# Patient Record
Sex: MALE | Race: BLACK OR AFRICAN AMERICAN | NOT HISPANIC OR LATINO | Employment: FULL TIME | URBAN - METROPOLITAN AREA
[De-identification: names, ages, dates, MRNs, and addresses within clinical notes are randomized per-mention and may not be internally consistent; named-entity substitution may affect disease eponyms.]

---

## 2017-01-10 ENCOUNTER — OFFICE VISIT (OUTPATIENT)
Dept: FAMILY MEDICINE | Facility: CLINIC | Age: 57
End: 2017-01-10
Payer: COMMERCIAL

## 2017-01-10 VITALS
TEMPERATURE: 98 F | OXYGEN SATURATION: 99 % | HEART RATE: 75 BPM | BODY MASS INDEX: 28.7 KG/M2 | RESPIRATION RATE: 18 BRPM | HEIGHT: 72 IN | WEIGHT: 211.88 LBS | DIASTOLIC BLOOD PRESSURE: 72 MMHG | SYSTOLIC BLOOD PRESSURE: 112 MMHG

## 2017-01-10 DIAGNOSIS — J01.90 ACUTE SINUSITIS, RECURRENCE NOT SPECIFIED, UNSPECIFIED LOCATION: Primary | ICD-10-CM

## 2017-01-10 PROCEDURE — 99213 OFFICE O/P EST LOW 20 MIN: CPT | Mod: 25,S$GLB,, | Performed by: FAMILY MEDICINE

## 2017-01-10 PROCEDURE — 1159F MED LIST DOCD IN RCRD: CPT | Mod: S$GLB,,, | Performed by: FAMILY MEDICINE

## 2017-01-10 PROCEDURE — 99999 PR PBB SHADOW E&M-EST. PATIENT-LVL III: CPT | Mod: PBBFAC,,, | Performed by: FAMILY MEDICINE

## 2017-01-10 PROCEDURE — 96372 THER/PROPH/DIAG INJ SC/IM: CPT | Mod: S$GLB,,, | Performed by: FAMILY MEDICINE

## 2017-01-10 RX ORDER — AMOXICILLIN AND CLAVULANATE POTASSIUM 875; 125 MG/1; MG/1
1 TABLET, FILM COATED ORAL 2 TIMES DAILY
Qty: 20 TABLET | Refills: 0 | Status: SHIPPED | OUTPATIENT
Start: 2017-01-10 | End: 2017-01-20

## 2017-01-10 RX ORDER — BETAMETHASONE SODIUM PHOSPHATE AND BETAMETHASONE ACETATE 3; 3 MG/ML; MG/ML
12 INJECTION, SUSPENSION INTRA-ARTICULAR; INTRALESIONAL; INTRAMUSCULAR; SOFT TISSUE
Status: COMPLETED | OUTPATIENT
Start: 2017-01-10 | End: 2017-01-10

## 2017-01-10 RX ADMIN — BETAMETHASONE SODIUM PHOSPHATE AND BETAMETHASONE ACETATE 12 MG: 3; 3 INJECTION, SUSPENSION INTRA-ARTICULAR; INTRALESIONAL; INTRAMUSCULAR; SOFT TISSUE at 03:01

## 2017-01-10 NOTE — PROGRESS NOTES
Subjective:       Patient ID: Rob Hickey Jr. is a 56 y.o. male.    Chief Complaint: Sinus Problem    HPI Comments: Mr. Hickey, a nonsmoker, comes in today with complaint of having intermittent sinus symptoms-slight wheezing, itchy eyes, and sore throat early in the week, sinus pressure, headache, nasal congestion, postnasal drip without other associated symptoms.  He states he has been taken over-the-counter sinus medication with little help.  He did not have flu shot this fall.        Sinus Problem   Associated symptoms include congestion, headaches, sinus pressure and a sore throat. Pertinent negatives include no chills, coughing, shortness of breath or sneezing.     Review of Systems   Constitutional: Negative for appetite change, chills, fatigue and fever.   HENT: Positive for congestion, postnasal drip, sinus pressure and sore throat. Negative for rhinorrhea and sneezing.    Eyes: Negative for pain, discharge, redness and itching.   Respiratory: Positive for wheezing. Negative for cough and shortness of breath.    Cardiovascular: Negative for chest pain and palpitations.   Gastrointestinal: Negative for abdominal pain, diarrhea, nausea and vomiting.   Musculoskeletal: Negative for myalgias.   Neurological: Positive for headaches.       Objective:      Physical Exam   Constitutional: He is oriented to person, place, and time. He appears well-developed and well-nourished. No distress.   Pleasant.   HENT:   Head: Normocephalic and atraumatic.   Right Ear: External ear normal.   Left Ear: External ear normal.   Nose: Nose normal.   Mouth/Throat: Oropharynx is clear and moist. No oropharyngeal exudate.   Non tender sinuses.  TMs shiny and clear.  Nasal mucosa inflamed, congested (left > right) with slight yellowish drainage noted.   Eyes: Conjunctivae and EOM are normal. Pupils are equal, round, and reactive to light. Right eye exhibits no discharge. Left eye exhibits no discharge.   He wears glasses.   Neck:  Normal range of motion. Neck supple. No thyromegaly present.   Cardiovascular: Normal rate, regular rhythm, normal heart sounds and intact distal pulses.    No murmur heard.  Pulmonary/Chest: Effort normal and breath sounds normal. No respiratory distress. He has no wheezes.   Abdominal: Soft. Bowel sounds are normal. He exhibits no distension and no mass. There is no tenderness. There is no rebound and no guarding.   Musculoskeletal: Normal range of motion. He exhibits no edema or tenderness.   He is ambulatory without problems.   Lymphadenopathy:     He has no cervical adenopathy.   Neurological: He is alert and oriented to person, place, and time.   Skin: He is not diaphoretic.   Psychiatric: He has a normal mood and affect. His behavior is normal. Judgment and thought content normal.   Vitals reviewed.      Assessment:       1. Acute sinusitis, recurrence not specified, unspecified location        Plan:       Augmentin 875 mg twice daily for 10 days.  Celestone 12 mg IM x 1 today.  Continue symptomatic treatment.  Follow up sooner if no improvement or worsening symptoms noted.

## 2017-01-10 NOTE — MR AVS SNAPSHOT
[facility]  [address] [place] [address]  Phone: [phone]                  Rob Hickey Jr.   1/10/2017 8:30 AM   Office Visit    Description:  Male : 1960   Provider:  Mary Bruner MD   Department:  [facility]           Reason for Visit     Sinus Problem           Diagnoses this Visit        Comments    Acute sinusitis, recurrence not specified, unspecified location    -  Primary            To Do List           Goals (5 Years of Data)     None      Follow-Up and Disposition     Return if symptoms worsen or fail to improve.       These Medications        Disp Refills Start End    amoxicillin-clavulanate 875-125mg (AUGMENTIN) 875-125 mg per tablet 20 tablet 0 1/10/2017 2017    Take 1 tablet by mouth 2 (two) times daily. - Oral    Pharmacy: [facility]/pharmacy #[id number] - [address] #: [phone]         [facility] On Call     [facility] On Call Nurse Care Line -  Assistance  Registered nurses in the [facility] On Call Center provide clinical advisement, health education, appointment booking, and other advisory services.  Call for this free service at [phone].             Medications           Message regarding Medications     Verify the changes and/or additions to your medication regime listed below are the same as discussed with your clinician today.  If any of these changes or additions are incorrect, please notify your healthcare provider.        START taking these NEW medications        Refills    amoxicillin-clavulanate 875-125mg (AUGMENTIN) 875-125 mg per tablet 0    Sig: Take 1 tablet by mouth 2 (two) times daily.    Class: Normal    Route: Oral      These medications were administered today        Dose Freq    betamethasone acetate-betamethasone sodium phosphate injection 12 mg 12 mg Clinic/HOD 1 time    Sig: Inject 2 mLs (12 mg total) into the muscle one time.    Class: Normal    Route: Intramuscular     "       Verify that the below list of medications is an accurate representation of the medications you are currently taking.  If none reported, the list may be blank. If incorrect, please contact your healthcare provider. Carry this list with you in case of emergency.           Current Medications     amoxicillin-clavulanate 875-125mg (AUGMENTIN) 875-125 mg per tablet Take 1 tablet by mouth 2 (two) times daily.    sertraline (ZOLOFT) 50 MG tablet Take 1 tablet (50 mg total) by mouth once daily.           Clinical Reference Information           Vital Signs - Last Recorded  Most recent update: 1/10/2017  2:46 PM by Sada Kuo MA    BP Pulse Temp Resp    112/72 (BP Location: Right arm, Patient Position: Sitting, BP Method: Manual) 75 98.2 °F (36.8 °C) (Tympanic) 18    Ht Wt SpO2 BMI    5' 11.5" (1.816 m) 96.1 kg (211 lb 13.8 oz) 99% 29.14 kg/m2      Blood Pressure          Most Recent Value    BP  112/72      Allergies as of 1/10/2017     No Known Allergies      Immunizations Administered on Date of Encounter - 1/10/2017     None      Administrations This Visit     betamethasone acetate-betamethasone sodium phosphate injection 12 mg     Admin Date Action Dose Route Administered By             01/10/2017 Given 12 mg Intramuscular Mey Astudillo LPN                      [facility] Sign-Up     Activating your [facility] account is as easy as 1-2-3!     1) Visit [email or web address], select Sign Up Now, enter this activation code and your date of birth, then select Next.  [email or web address]  Expires: 2/24/2017  3:09 PM      2) Create a username and password to use when you visit [facility] in the future and select a security question in case you lose your password and select Next.    3) Enter your e-mail address and click Sign Up!    Additional Information  If you have questions, please e-mail [email or web address] or call [phone] to talk to our [facility] staff. Remember, [facility] is NOT to be used for urgent " needs. For medical emergencies, dial 911.

## 2019-12-10 ENCOUNTER — OFFICE VISIT (OUTPATIENT)
Dept: FAMILY MEDICINE | Facility: CLINIC | Age: 59
End: 2019-12-10
Payer: COMMERCIAL

## 2019-12-10 VITALS
DIASTOLIC BLOOD PRESSURE: 70 MMHG | SYSTOLIC BLOOD PRESSURE: 110 MMHG | TEMPERATURE: 98 F | RESPIRATION RATE: 16 BRPM | BODY MASS INDEX: 34.09 KG/M2 | OXYGEN SATURATION: 98 % | HEIGHT: 70 IN | HEART RATE: 91 BPM | WEIGHT: 238.13 LBS

## 2019-12-10 DIAGNOSIS — Z23 NEED FOR INFLUENZA VACCINATION: ICD-10-CM

## 2019-12-10 DIAGNOSIS — L82.1 SEBORRHEIC KERATOSES: Primary | ICD-10-CM

## 2019-12-10 DIAGNOSIS — Z23 NEED FOR TDAP VACCINATION: ICD-10-CM

## 2019-12-10 PROCEDURE — 99999 PR PBB SHADOW E&M-EST. PATIENT-LVL III: CPT | Mod: PBBFAC,,, | Performed by: FAMILY MEDICINE

## 2019-12-10 PROCEDURE — 90471 FLU VACCINE (QUAD) GREATER THAN OR EQUAL TO 3YO PRESERVATIVE FREE IM: ICD-10-PCS | Mod: S$GLB,,, | Performed by: FAMILY MEDICINE

## 2019-12-10 PROCEDURE — 90472 IMMUNIZATION ADMIN EACH ADD: CPT | Mod: S$GLB,,, | Performed by: FAMILY MEDICINE

## 2019-12-10 PROCEDURE — 99999 PR PBB SHADOW E&M-EST. PATIENT-LVL III: ICD-10-PCS | Mod: PBBFAC,,, | Performed by: FAMILY MEDICINE

## 2019-12-10 PROCEDURE — 90715 TDAP VACCINE 7 YRS/> IM: CPT | Mod: S$GLB,,, | Performed by: FAMILY MEDICINE

## 2019-12-10 PROCEDURE — 90686 IIV4 VACC NO PRSV 0.5 ML IM: CPT | Mod: S$GLB,,, | Performed by: FAMILY MEDICINE

## 2019-12-10 PROCEDURE — 99213 PR OFFICE/OUTPT VISIT, EST, LEVL III, 20-29 MIN: ICD-10-PCS | Mod: 25,S$GLB,, | Performed by: FAMILY MEDICINE

## 2019-12-10 PROCEDURE — 90686 FLU VACCINE (QUAD) GREATER THAN OR EQUAL TO 3YO PRESERVATIVE FREE IM: ICD-10-PCS | Mod: S$GLB,,, | Performed by: FAMILY MEDICINE

## 2019-12-10 PROCEDURE — 90472 TDAP VACCINE GREATER THAN OR EQUAL TO 7YO IM: ICD-10-PCS | Mod: S$GLB,,, | Performed by: FAMILY MEDICINE

## 2019-12-10 PROCEDURE — 90715 TDAP VACCINE GREATER THAN OR EQUAL TO 7YO IM: ICD-10-PCS | Mod: S$GLB,,, | Performed by: FAMILY MEDICINE

## 2019-12-10 PROCEDURE — 99213 OFFICE O/P EST LOW 20 MIN: CPT | Mod: 25,S$GLB,, | Performed by: FAMILY MEDICINE

## 2019-12-10 PROCEDURE — 90471 IMMUNIZATION ADMIN: CPT | Mod: S$GLB,,, | Performed by: FAMILY MEDICINE

## 2019-12-10 NOTE — PROGRESS NOTES
Rob Hickey .    Chief Complaint   Patient presents with    Skin Check       History of Present Illness:   Mr. Hickey comes in today with concern about moles in his face (with specific concern about 1 particular mole at his right cheek).  He states he sister, who is a physician, advised him to get his skin checked.  He reports no change in size of moles his face; he denies pain, itching, or bleeding of moles in his face.  He denies personal history of skin cancer or known family history of skin cancer.    Otherwise, he denies having fever, chills, fatigue, appetite changes; shortness of breath, cough, wheezing; chest pain, palpitations, leg swelling; abdominal pain, nausea, vomiting, diarrhea, constipation; unusual urinary symptoms; back pain; headache; anxiety, depression, homicidal or suicidal thoughts.     He requests immunizations (flu and tetanus) today.  He states he has not had tetanus shot within the last 10 years.               No current outpatient medications on file.       Review of Systems   Constitutional: Negative for appetite change, chills, fatigue and fever.   Respiratory: Negative for cough, shortness of breath and wheezing.    Cardiovascular: Negative for chest pain, palpitations and leg swelling.   Gastrointestinal: Negative for abdominal pain, constipation, diarrhea, nausea and vomiting.   Genitourinary: Negative for difficulty urinating.   Skin:        See history of present illness.   Neurological: Negative for headaches.   Psychiatric/Behavioral: Negative for dysphoric mood and suicidal ideas. The patient is not nervous/anxious.         Negative for homicidal ideas.         Objective:  Physical Exam   Constitutional: He is oriented to person, place, and time. He appears well-developed and well-nourished. No distress.   Pleasant.   Neck: Normal range of motion. Neck supple. No thyromegaly present.   Cardiovascular: Normal rate, regular rhythm, normal heart sounds and intact distal  pulses.   No murmur heard.  Pulmonary/Chest: Effort normal and breath sounds normal. No respiratory distress. He has no wheezes.   Abdominal: Soft. Bowel sounds are normal. He exhibits no distension and no mass. There is no tenderness. There is no guarding.   Musculoskeletal: Normal range of motion.   He is ambulatory without problems.   Lymphadenopathy:     He has no cervical adenopathy.   Neurological: He is alert and oriented to person, place, and time.   Skin: He is not diaphoretic.   Benign-appearing flat, brown moles at face of varying sizes (seborrheic keratoses) without inflammation noted - large mole at each side of face (right cheek) and (below left temple).  Also, benign-appearing flat, brown moles/skin tags without inflammation noted at neck.   Psychiatric: He has a normal mood and affect. His behavior is normal. Judgment and thought content normal.   He has slight depressed demeanor when speaking of his job.   Vitals reviewed.      ASSESSMENT:  1. Seborrheic keratoses    2. Need for influenza vaccination    3. Need for Tdap vaccination        PLAN:  Rob was seen today for skin check.    Diagnoses and all orders for this visit:    Seborrheic keratoses    Need for influenza vaccination  -     Influenza - Quadrivalent (6 months+) (PF)    Need for Tdap vaccination  -     Tdap Vaccine    Reassurance regarding benign-appearing skin findings; however, advised patient to closely monitor and follow up if changes noted.  Continue current medications, follow low sodium, low cholesterol, low carb diet, daily walks.  Follow up for physical in 2020.

## 2020-03-02 ENCOUNTER — PATIENT OUTREACH (OUTPATIENT)
Dept: ADMINISTRATIVE | Facility: HOSPITAL | Age: 60
End: 2020-03-02

## 2020-03-02 DIAGNOSIS — Z12.11 ENCOUNTER FOR SCREENING COLONOSCOPY: Primary | ICD-10-CM

## 2020-03-03 ENCOUNTER — TELEPHONE (OUTPATIENT)
Dept: ENDOSCOPY | Facility: HOSPITAL | Age: 60
End: 2020-03-03

## 2020-08-10 ENCOUNTER — OFFICE VISIT (OUTPATIENT)
Dept: FAMILY MEDICINE | Facility: CLINIC | Age: 60
End: 2020-08-10
Payer: COMMERCIAL

## 2020-08-10 VITALS
WEIGHT: 248.25 LBS | BODY MASS INDEX: 35.54 KG/M2 | OXYGEN SATURATION: 98 % | RESPIRATION RATE: 16 BRPM | HEIGHT: 70 IN | TEMPERATURE: 99 F | DIASTOLIC BLOOD PRESSURE: 80 MMHG | SYSTOLIC BLOOD PRESSURE: 148 MMHG | HEART RATE: 71 BPM

## 2020-08-10 DIAGNOSIS — E66.9 OBESITY (BMI 35.0-39.9 WITHOUT COMORBIDITY): ICD-10-CM

## 2020-08-10 DIAGNOSIS — Z00.00 ANNUAL PHYSICAL EXAM: ICD-10-CM

## 2020-08-10 DIAGNOSIS — Z12.11 COLON CANCER SCREENING: ICD-10-CM

## 2020-08-10 DIAGNOSIS — R00.2 PALPITATIONS: ICD-10-CM

## 2020-08-10 DIAGNOSIS — R03.0 ELEVATED BLOOD PRESSURE READING: ICD-10-CM

## 2020-08-10 PROCEDURE — 99396 PR PREVENTIVE VISIT,EST,40-64: ICD-10-PCS | Mod: S$GLB,,, | Performed by: FAMILY MEDICINE

## 2020-08-10 PROCEDURE — 99396 PREV VISIT EST AGE 40-64: CPT | Mod: S$GLB,,, | Performed by: FAMILY MEDICINE

## 2020-08-10 PROCEDURE — 99999 PR PBB SHADOW E&M-EST. PATIENT-LVL IV: CPT | Mod: PBBFAC,,, | Performed by: FAMILY MEDICINE

## 2020-08-10 PROCEDURE — 99999 PR PBB SHADOW E&M-EST. PATIENT-LVL IV: ICD-10-PCS | Mod: PBBFAC,,, | Performed by: FAMILY MEDICINE

## 2020-08-10 NOTE — PROGRESS NOTES
HISTORY OF PRESENT ILLNESS: Mr. Hickey comes in today for annual wellness examination.  He is not fasting today.    END OF LIFE DECISION:He does not have a living will. He does desire life support.    No current outpatient medications on file.     SCREENINGS:    Cholesterol: February 4, 2004.  Colonoscopy: Never. He desires.  Prostate Exam/PSA: Never. He desires.  Eye Exam: September 2019 per patient. He glasses.  HCVAb: Never. He desires.  HIVAb: Never. He desires.  PPD: Negative the past.    Immunizations:    Shingrix: Never.  Reports no history of varicella or zoster.  Tdap: December 10, 2019.  Flu shot: December 10, 2019.      ROS:  GENERAL: No fever, chills, fatigue or unusual weight change. Appetite normal. Exercises not leisurely but moves around daily at job. Monitors diet does. Weight 108 kg (238 lb 1.6 oz) at December 10, 2019 visit.  SKIN: No rashes, itching, changes in mole, color or texture of skin or easy bruising.  HEAD: No headaches or recent head trauma.  EYES: No change in vision,   no pain, diplopia, redness or discharge.  EARS: Denies ear pain, discharge, vertigo or decreased hearing.  NOSE: No epistaxis or rhinitis. Nontender external nose.  MOUTH & THROAT: No hoarseness or change in voice. No excessive gum bleeding or mouth sores.  No sore throat.  NODES: Denies swollen glands.  CHEST: Denies KENNEY, wheezing, cough, hemoptysis or sputum production.  CARDIOVASCULAR: Denies chest pain. Reports palpitations with heart rate of 150-200's at rest and with feeling of rapid heartbeat. Does not consume caffeine or use of OTC sinus medication.  Reports saw cardiologist appx. 10 years ago for similar symptoms with complete cardiac work up (EKG, stress test, ECHO, Holter monitor) with unremarkable findings. Reports no history of hypertension.  ABDOMEN: Denies diarrhea, constipation, vomiting, abdominal pain, or blood in stool.  GENITOURINARY: No flank pain, dysuria or hematuria.No nocturia or frequency. No  "lesions, pain or swelling in genital area. Performs monthly self testicular exam does.  ENDOCRINE: Denies diabetes, thyroid or cholesterol problems. Performs home glucose checks: N./A  HEME/LYMPH: Denies bleeding problems.  PERIPHERAL VASCULAR: No claudication or cyanosis  MUSCULOSKELETAL: No joint stiffness, pain or swelling. No edema.  NEUROLOGIC: No history of seizures, tremors, alteration of gait or coordination.  PSYCHIATRIC: Denies mood swings, depression, anxiety, homicidal or suicidal thoughts. Denies sleep problems.    PE:   VS:  BP (!) 148/80 Comment: Rechecked by Dr. Bruner.  Pulse 71   Temp 98.8 °F (37.1 °C) (Temporal)   Resp 16   Ht 5' 10" (1.778 m)   Wt 112.6 kg (248 lb 3.8 oz)   SpO2 98%   BMI 35.62 kg/m²   APPEARANCE:  Well nourished, well developed male, pleasant and obese, alert and oriented in no acute distress.    HEAD: Non tender . Full range of motion.  EYES: PERRL, conjunctiva pink, lids no edema.  EARS: External canal patent, no swelling or redness. TM's shiny and clear.  NOSE: Mucosa and turbinates pink, not swollen. No discharge  THROAT: No pharyngeal erythema or exudate. No stridor.    NECK: Supple, no mass, thyroid not enlarged. No carotid bruit.  NODES: No cervical, axillary or inguinal lymph node enlargement.  CHEST: Normal respiratory effort. Lungs clear to auscultation.  CARDIOVASCULAR: Normal S1, S2. No rubs, murmurs or gallops.No edema.Pedal pulses palpable bilaterally.  ABDOMEN: Bowel sounds present. Not distended. Soft. No tenderness, masses or organomegaly.  BREAST: Nontender, no asymmetry, nipple discharge, abnormal masses, nodules, lumps.  GENITALIA: Scrotum no lesions, masses, tenderness or swelling.  No penile lesions. No hernia.  RECTAL: Sphincter tone normal, no masses palpated, prostate not enlarged, no nodules, stool Hemoccult negative, no external hemorrhoids and anal fissures noted.  MUSCULOSKELETAL: No joint deformities or stiffness. He is ambulatory without " problems.  SKIN: No rashes or suspicious lesions, normal color and turgor.  NEUROLOGIC: Cranial Nerves: II-XII grossly intact.  DTR's: Knees, Ankles 2+ and equal bilaterally Gait & Posture: Normal gait and fine motion.  PSYCHIATRIC: Patient alert, oriented x 3. Mood/Affect normal without acute anxiety and depression noted.Judgement and insight-good as he makes appropriate decisions on today's examination.    DIAGNOSIS:    ICD-10-CM ICD-9-CM    1. Annual physical exam  Z00.00 V70.0 CBC auto differential      TSH      Urinalysis      Comprehensive metabolic panel      Lipid Panel      Magnesium      PSA, Screening      HIV 1/2 Ag/Ab (4th Gen)      Hepatitis C Antibody   2. Elevated blood pressure reading  R03.0 796.2    3. Palpitations  R00.2 785.1 Ambulatory referral/consult to Cardiology   4. Obesity (BMI 35.0-39.9 without comorbidity)  E66.9 278.00    5. Colon cancer screening  Z12.11 V76.51 Case request GI: COLONOSCOPY       PLAN:   1. Age-appropriate counseling-appropriate low-sodium, low-cholesterol, low carbohydrate diet and exercise daily, monthly self testicular examination, annual wellness examination.  2. Patient advised to call for results.  3. I advised patient to perform home blood pressure monitoring daily and keep record to show cardiologist; I advised desired blood pressure level is <140/90.  4. Continue current medications.  5. Keep follow up with specialists.  6. Flu shot this fall.  7. Follow up if symptoms worsen or fail to improve.

## 2020-09-08 ENCOUNTER — LAB VISIT (OUTPATIENT)
Dept: LAB | Facility: HOSPITAL | Age: 60
End: 2020-09-08
Attending: FAMILY MEDICINE
Payer: COMMERCIAL

## 2020-09-08 DIAGNOSIS — Z00.00 ANNUAL PHYSICAL EXAM: ICD-10-CM

## 2020-09-08 LAB
BASOPHILS # BLD AUTO: 0.03 K/UL (ref 0–0.2)
BASOPHILS NFR BLD: 0.4 % (ref 0–1.9)
DIFFERENTIAL METHOD: ABNORMAL
EOSINOPHIL # BLD AUTO: 0.2 K/UL (ref 0–0.5)
EOSINOPHIL NFR BLD: 2.3 % (ref 0–8)
ERYTHROCYTE [DISTWIDTH] IN BLOOD BY AUTOMATED COUNT: 14.2 % (ref 11.5–14.5)
HCT VFR BLD AUTO: 47.4 % (ref 40–54)
HGB BLD-MCNC: 14.5 G/DL (ref 14–18)
IMM GRANULOCYTES # BLD AUTO: 0.03 K/UL (ref 0–0.04)
IMM GRANULOCYTES NFR BLD AUTO: 0.4 % (ref 0–0.5)
LYMPHOCYTES # BLD AUTO: 1.8 K/UL (ref 1–4.8)
LYMPHOCYTES NFR BLD: 21.1 % (ref 18–48)
MCH RBC QN AUTO: 28.9 PG (ref 27–31)
MCHC RBC AUTO-ENTMCNC: 30.6 G/DL (ref 32–36)
MCV RBC AUTO: 94 FL (ref 82–98)
MONOCYTES # BLD AUTO: 0.5 K/UL (ref 0.3–1)
MONOCYTES NFR BLD: 6.2 % (ref 4–15)
NEUTROPHILS # BLD AUTO: 5.8 K/UL (ref 1.8–7.7)
NEUTROPHILS NFR BLD: 69.6 % (ref 38–73)
NRBC BLD-RTO: 0 /100 WBC
PLATELET # BLD AUTO: 259 K/UL (ref 150–350)
PMV BLD AUTO: 10.9 FL (ref 9.2–12.9)
RBC # BLD AUTO: 5.02 M/UL (ref 4.6–6.2)
WBC # BLD AUTO: 8.37 K/UL (ref 3.9–12.7)

## 2020-09-08 PROCEDURE — 86803 HEPATITIS C AB TEST: CPT

## 2020-09-08 PROCEDURE — 84153 ASSAY OF PSA TOTAL: CPT

## 2020-09-08 PROCEDURE — 84443 ASSAY THYROID STIM HORMONE: CPT

## 2020-09-08 PROCEDURE — 36415 COLL VENOUS BLD VENIPUNCTURE: CPT | Mod: PO

## 2020-09-08 PROCEDURE — 85025 COMPLETE CBC W/AUTO DIFF WBC: CPT

## 2020-09-08 PROCEDURE — 80053 COMPREHEN METABOLIC PANEL: CPT

## 2020-09-08 PROCEDURE — 83735 ASSAY OF MAGNESIUM: CPT

## 2020-09-08 PROCEDURE — 80061 LIPID PANEL: CPT

## 2020-09-08 PROCEDURE — 86703 HIV-1/HIV-2 1 RESULT ANTBDY: CPT

## 2020-09-09 ENCOUNTER — PATIENT OUTREACH (OUTPATIENT)
Dept: ADMINISTRATIVE | Facility: OTHER | Age: 60
End: 2020-09-09

## 2020-09-09 DIAGNOSIS — R00.2 PALPITATIONS: Primary | ICD-10-CM

## 2020-09-09 LAB
ALBUMIN SERPL BCP-MCNC: 4.2 G/DL (ref 3.5–5.2)
ALP SERPL-CCNC: 58 U/L (ref 55–135)
ALT SERPL W/O P-5'-P-CCNC: 38 U/L (ref 10–44)
ANION GAP SERPL CALC-SCNC: 9 MMOL/L (ref 8–16)
AST SERPL-CCNC: 24 U/L (ref 10–40)
BILIRUB SERPL-MCNC: 0.4 MG/DL (ref 0.1–1)
BUN SERPL-MCNC: 12 MG/DL (ref 6–20)
CALCIUM SERPL-MCNC: 9 MG/DL (ref 8.7–10.5)
CHLORIDE SERPL-SCNC: 110 MMOL/L (ref 95–110)
CHOLEST SERPL-MCNC: 167 MG/DL (ref 120–199)
CHOLEST/HDLC SERPL: 3.2 {RATIO} (ref 2–5)
CO2 SERPL-SCNC: 22 MMOL/L (ref 23–29)
COMPLEXED PSA SERPL-MCNC: 0.93 NG/ML (ref 0–4)
CREAT SERPL-MCNC: 1 MG/DL (ref 0.5–1.4)
EST. GFR  (AFRICAN AMERICAN): >60 ML/MIN/1.73 M^2
EST. GFR  (NON AFRICAN AMERICAN): >60 ML/MIN/1.73 M^2
GLUCOSE SERPL-MCNC: 72 MG/DL (ref 70–110)
HCV AB SERPL QL IA: NEGATIVE
HDLC SERPL-MCNC: 53 MG/DL (ref 40–75)
HDLC SERPL: 31.7 % (ref 20–50)
HIV 1+2 AB+HIV1 P24 AG SERPL QL IA: NEGATIVE
LDLC SERPL CALC-MCNC: 100.4 MG/DL (ref 63–159)
MAGNESIUM SERPL-MCNC: 2.2 MG/DL (ref 1.6–2.6)
NONHDLC SERPL-MCNC: 114 MG/DL
POTASSIUM SERPL-SCNC: 4.5 MMOL/L (ref 3.5–5.1)
PROT SERPL-MCNC: 7.5 G/DL (ref 6–8.4)
SODIUM SERPL-SCNC: 141 MMOL/L (ref 136–145)
TRIGL SERPL-MCNC: 68 MG/DL (ref 30–150)
TSH SERPL DL<=0.005 MIU/L-ACNC: 1.61 UIU/ML (ref 0.4–4)

## 2020-09-09 NOTE — PROGRESS NOTES
Health Maintenance Due   Topic Date Due    Colorectal Cancer Screening  09/25/2010    Influenza Vaccine (1) 08/01/2020     Updates were requested from care everywhere.  Chart was reviewed for overdue Proactive [facility] Encounters ([facility]) topics (CRS, Breast Cancer Screening, Eye exam)  Health Maintenance has been updated.  LINKS immunization registry triggered.  Immunizations were reconciled.  Open case for colonoscopy in chart.

## 2020-09-10 ENCOUNTER — OFFICE VISIT (OUTPATIENT)
Dept: CARDIOLOGY | Facility: CLINIC | Age: 60
End: 2020-09-10
Payer: COMMERCIAL

## 2020-09-10 ENCOUNTER — CLINICAL SUPPORT (OUTPATIENT)
Dept: CARDIOLOGY | Facility: CLINIC | Age: 60
End: 2020-09-10
Payer: COMMERCIAL

## 2020-09-10 VITALS
HEIGHT: 70 IN | OXYGEN SATURATION: 97 % | WEIGHT: 250 LBS | BODY MASS INDEX: 35.79 KG/M2 | HEART RATE: 74 BPM | SYSTOLIC BLOOD PRESSURE: 128 MMHG | DIASTOLIC BLOOD PRESSURE: 82 MMHG

## 2020-09-10 DIAGNOSIS — R00.2 PALPITATIONS: ICD-10-CM

## 2020-09-10 DIAGNOSIS — R00.2 PALPITATIONS: Primary | ICD-10-CM

## 2020-09-10 DIAGNOSIS — Z82.49 FAMILY HISTORY OF CARDIOVASCULAR DISORDER: ICD-10-CM

## 2020-09-10 DIAGNOSIS — R94.31 ABNORMAL ECG: ICD-10-CM

## 2020-09-10 DIAGNOSIS — E66.9 OBESITY (BMI 35.0-39.9 WITHOUT COMORBIDITY): ICD-10-CM

## 2020-09-10 PROCEDURE — 99203 OFFICE O/P NEW LOW 30 MIN: CPT | Mod: 25,S$GLB,, | Performed by: INTERNAL MEDICINE

## 2020-09-10 PROCEDURE — 99999 PR PBB SHADOW E&M-EST. PATIENT-LVL III: CPT | Mod: PBBFAC,,, | Performed by: INTERNAL MEDICINE

## 2020-09-10 PROCEDURE — 99203 PR OFFICE/OUTPT VISIT, NEW, LEVL III, 30-44 MIN: ICD-10-PCS | Mod: 25,S$GLB,, | Performed by: INTERNAL MEDICINE

## 2020-09-10 PROCEDURE — 93000 EKG 12-LEAD: ICD-10-PCS | Mod: S$GLB,,, | Performed by: INTERNAL MEDICINE

## 2020-09-10 PROCEDURE — 93000 ELECTROCARDIOGRAM COMPLETE: CPT | Mod: S$GLB,,, | Performed by: INTERNAL MEDICINE

## 2020-09-10 PROCEDURE — 99999 PR PBB SHADOW E&M-EST. PATIENT-LVL III: ICD-10-PCS | Mod: PBBFAC,,, | Performed by: INTERNAL MEDICINE

## 2020-09-10 NOTE — PROGRESS NOTES
"Subjective:    Patient ID:  Rob Hickey Jr. is a 59 y.o. male who presents for evaluation of Palpitations      Pt referred by Dr. Mary Bruner      HPI  Pt presents for palpitations.  Nonsmoker.  Has had palpitations over last year, at random, can last up to an hour.  Associated with sweats.    No associated chest pain sxs.  No dyspnea.   ecg today NSR, nonspecific T wave abnl.  Bother had massive MI age 45.  Sister had MI in her 20's.         No past medical history on file.      No current outpatient medications on file.      Review of Systems   Constitution: Positive for diaphoresis.   HENT: Negative.    Eyes: Negative.    Cardiovascular: Positive for palpitations.   Respiratory: Negative.    Endocrine: Negative.    Hematologic/Lymphatic: Negative.    Skin: Negative.    Musculoskeletal: Negative.    Gastrointestinal: Negative.    Genitourinary: Negative.    Neurological: Negative.    Psychiatric/Behavioral: Negative.    Allergic/Immunologic: Negative.        /82 (BP Location: Left arm, Patient Position: Sitting, BP Method: Large (Manual))   Pulse 74   Ht 5' 10" (1.778 m)   Wt 113.4 kg (250 lb)   SpO2 97%   BMI 35.87 kg/m²     Wt Readings from Last 3 Encounters:   09/10/20 113.4 kg (250 lb)   08/10/20 112.6 kg (248 lb 3.8 oz)   12/10/19 108 kg (238 lb 1.6 oz)     Temp Readings from Last 3 Encounters:   08/10/20 98.8 °F (37.1 °C) (Temporal)   12/10/19 98.1 °F (36.7 °C) (Oral)   01/10/17 98.2 °F (36.8 °C) (Tympanic)     BP Readings from Last 3 Encounters:   09/10/20 128/82   08/10/20 (!) 148/80   12/10/19 110/70     Pulse Readings from Last 3 Encounters:   09/10/20 74   08/10/20 71   12/10/19 91          Objective:    Physical Exam   Constitutional: He is oriented to person, place, and time. He appears well-developed and well-nourished.   HENT:   Head: Normocephalic.   Neck: Normal range of motion. Neck supple. Normal carotid pulses, no hepatojugular reflux and no JVD present. Carotid bruit is not " present. No thyromegaly present.   Cardiovascular: Normal rate, regular rhythm, S1 normal and S2 normal. PMI is not displaced. Exam reveals no S3, no S4, no distant heart sounds, no friction rub, no midsystolic click and no opening snap.   No murmur heard.  Pulses:       Radial pulses are 2+ on the right side and 2+ on the left side.   Pulmonary/Chest: Effort normal and breath sounds normal. He has no wheezes. He has no rales.   Abdominal: Soft. Bowel sounds are normal. He exhibits no distension, no abdominal bruit, no ascites and no mass. There is no abdominal tenderness.   Musculoskeletal:         General: No edema.   Neurological: He is alert and oriented to person, place, and time.   Skin: Skin is warm.   Psychiatric: He has a normal mood and affect. His behavior is normal.   Nursing note and vitals reviewed.      I have reviewed all pertinent labs and cardiac studies.      Chemistry        Component Value Date/Time     09/08/2020 0914    K 4.5 09/08/2020 0914     09/08/2020 0914    CO2 22 (L) 09/08/2020 0914    BUN 12 09/08/2020 0914    CREATININE 1.0 09/08/2020 0914    GLU 72 09/08/2020 0914        Component Value Date/Time    CALCIUM 9.0 09/08/2020 0914    ALKPHOS 58 09/08/2020 0914    AST 24 09/08/2020 0914    ALT 38 09/08/2020 0914    BILITOT 0.4 09/08/2020 0914    ESTGFRAFRICA >60.0 09/08/2020 0914    EGFRNONAA >60.0 09/08/2020 0914          Lab Results   Component Value Date    WBC 8.37 09/08/2020    HGB 14.5 09/08/2020    HCT 47.4 09/08/2020    MCV 94 09/08/2020     09/08/2020       Lab Results   Component Value Date    TSH 1.611 09/08/2020     No results found for: LABA1C, HGBA1C  Lab Results   Component Value Date    CHOL 167 09/08/2020    CHOL 215 (H) 02/04/2004     Lab Results   Component Value Date    HDL 53 09/08/2020    HDL 43.0 02/04/2004     Lab Results   Component Value Date    LDLCALC 100.4 09/08/2020    LDLCALC 152.0 (H) 02/04/2004     Lab Results   Component Value Date     TRIG 68 09/08/2020    TRIG 100 02/04/2004     Lab Results   Component Value Date    CHOLHDL 31.7 09/08/2020    CHOLHDL 20.0 02/04/2004           Assessment:       1. Palpitations    2. Obesity (BMI 35.0-39.9 without comorbidity)    3. Family history of cardiovascular disorder    4. Abnormal ECG         Plan:             Episodes of elevated HR/palpitations with associated sweats.  Concerns for arrhythmias and/or CAD with his family h/o premature CAD.  Needs ischemic workup.  Stress echocardiogram.  2 week Zio Holter.  Discussed possible LHC should stress testing reveal evidence of ischemic heart disease.  Discussed possible left heart catheterization with possible PCI if warranted.  Pt advised of all risks and benefits of procedure.  Pt advised of alternative approaches and treatment strategies to include medical therapy, PCI as well as surgical revascularization with possible CABG.  All questions answered in clinic.   Discussed coronary calcium score option, pros/cons.  Will schedule if stress test negative.  Risk factor modification.  Cardiac diet.  Daily exercise.  Weight loss needed.  F/u after tests to review and make further tx decisions.

## 2020-09-10 NOTE — LETTER
September 10, 2020      Mary Bruner MD  [address] [address]           [facility]  [address] [place] [address]  Phone: [phone]          Patient: Rob Hickey Jr.   MR Number: [id number]   YOB: 1960   Date of Visit: 9/10/2020       Dear Dr. Mary Bruner:    Thank you for referring Rob Hickey to me for evaluation. Attached you will find relevant portions of my assessment and plan of care.    If you have questions, please do not hesitate to call me. I look forward to following Rob Hickey along with you.    Sincerely,    Carlos Grajeda MD    Enclosure  CC:  No Recipients    If you would like to receive this communication electronically, please contact [email or web address] or [phone] to request more information on [employer] access.    For providers and/or their staff who would like to refer a patient to [facility], please contact us through our one-stop-shop provider referral line, Charis Concepcionge, at [phone].    If you feel you have received this communication in error or would no longer like to receive these types of communications, please e-mail [email or web address]

## 2020-09-21 ENCOUNTER — HOSPITAL ENCOUNTER (OUTPATIENT)
Dept: RADIOLOGY | Facility: HOSPITAL | Age: 60
Discharge: HOME | End: 2020-09-21
Attending: INTERNAL MEDICINE
Payer: COMMERCIAL

## 2020-09-21 ENCOUNTER — HOSPITAL ENCOUNTER (OUTPATIENT)
Dept: CARDIOLOGY | Facility: HOSPITAL | Age: 60
Discharge: HOME | End: 2020-09-21
Attending: INTERNAL MEDICINE
Payer: COMMERCIAL

## 2020-09-21 DIAGNOSIS — Z82.49 FAMILY HISTORY OF CARDIOVASCULAR DISORDER: ICD-10-CM

## 2020-09-21 DIAGNOSIS — E66.9 OBESITY (BMI 35.0-39.9 WITHOUT COMORBIDITY): ICD-10-CM

## 2020-09-21 DIAGNOSIS — R94.31 ABNORMAL ECG: ICD-10-CM

## 2020-09-21 DIAGNOSIS — R00.2 PALPITATIONS: ICD-10-CM

## 2020-09-21 PROCEDURE — 0298T CV CARDIAC MONITOR - 3-14 DAY ADULT (CUPID ONLY): ICD-10-PCS | Mod: ,,, | Performed by: INTERNAL MEDICINE

## 2020-09-21 PROCEDURE — 75571 CT HRT W/O DYE W/CA TEST: CPT | Mod: 26,,, | Performed by: RADIOLOGY

## 2020-09-21 PROCEDURE — 0296T CV CARDIAC MONITOR - 3-14 DAY ADULT (CUPID ONLY): ICD-10-PCS | Mod: ,,, | Performed by: INTERNAL MEDICINE

## 2020-09-21 PROCEDURE — 75571 CT HRT W/O DYE W/CA TEST: CPT | Mod: TC

## 2020-09-21 PROCEDURE — 75571 CT CALCIUM SCORING CARDIAC: ICD-10-PCS | Mod: 26,,, | Performed by: RADIOLOGY

## 2020-09-21 PROCEDURE — 0298T CV CARDIAC MONITOR - 3-14 DAY ADULT (CUPID ONLY): CPT | Mod: ,,, | Performed by: INTERNAL MEDICINE

## 2020-09-21 PROCEDURE — 0296T CV CARDIAC MONITOR - 3-14 DAY ADULT (CUPID ONLY): CPT | Mod: ,,, | Performed by: INTERNAL MEDICINE

## 2020-10-01 ENCOUNTER — HOSPITAL ENCOUNTER (OUTPATIENT)
Dept: CARDIOLOGY | Facility: HOSPITAL | Age: 60
Discharge: HOME | End: 2020-10-01
Attending: INTERNAL MEDICINE
Payer: COMMERCIAL

## 2020-10-01 VITALS — WEIGHT: 250 LBS | HEIGHT: 70 IN | BODY MASS INDEX: 35.79 KG/M2

## 2020-10-01 DIAGNOSIS — R00.2 PALPITATIONS: ICD-10-CM

## 2020-10-01 DIAGNOSIS — R94.31 ABNORMAL ECG: ICD-10-CM

## 2020-10-01 DIAGNOSIS — E66.9 OBESITY (BMI 35.0-39.9 WITHOUT COMORBIDITY): ICD-10-CM

## 2020-10-01 DIAGNOSIS — Z82.49 FAMILY HISTORY OF CARDIOVASCULAR DISORDER: ICD-10-CM

## 2020-10-01 LAB
AORTIC ROOT ANNULUS: 4.01 CM
ASCENDING AORTA: 3.81 CM
AV INDEX (PROSTH): 0.74
AV MEAN GRADIENT: 2 MMHG
AV PEAK GRADIENT: 4 MMHG
AV VALVE AREA: 2.71 CM2
AV VELOCITY RATIO: 0.76
BSA FOR ECHO PROCEDURE: 2.37 M2
CV ECHO LV RWT: 0.57 CM
CV STRESS BASE HR: 75 BPM
DIASTOLIC BLOOD PRESSURE: 85 MMHG
DOP CALC AO PEAK VEL: 0.97 M/S
DOP CALC AO VTI: 22 CM
DOP CALC LVOT AREA: 3.7 CM2
DOP CALC LVOT DIAMETER: 2.16 CM
DOP CALC LVOT PEAK VEL: 0.74 M/S
DOP CALC LVOT STROKE VOLUME: 59.66 CM3
DOP CALC RVOT PEAK VEL: 0.52 M/S
DOP CALC RVOT VTI: 12.54 CM
DOP CALCLVOT PEAK VEL VTI: 16.29 CM
E WAVE DECELERATION TIME: 192.58 MSEC
E/A RATIO: 0.71
E/E' RATIO: 7.75 M/S
ECHO LV POSTERIOR WALL: 1.19 CM (ref 0.6–1.1)
FRACTIONAL SHORTENING: 36 % (ref 28–44)
INTERVENTRICULAR SEPTUM: 1.39 CM (ref 0.6–1.1)
IVRT: 124.17 MSEC
LA MAJOR: 5.7 CM
LA MINOR: 5.38 CM
LA WIDTH: 4.46 CM
LEFT ATRIUM SIZE: 3.85 CM
LEFT ATRIUM VOLUME INDEX: 35.2 ML/M2
LEFT ATRIUM VOLUME: 80.79 CM3
LEFT INTERNAL DIMENSION IN SYSTOLE: 2.7 CM (ref 2.1–4)
LEFT VENTRICLE DIASTOLIC VOLUME INDEX: 34.51 ML/M2
LEFT VENTRICLE DIASTOLIC VOLUME: 79.19 ML
LEFT VENTRICLE MASS INDEX: 87 G/M2
LEFT VENTRICLE SYSTOLIC VOLUME INDEX: 11.7 ML/M2
LEFT VENTRICLE SYSTOLIC VOLUME: 26.96 ML
LEFT VENTRICULAR INTERNAL DIMENSION IN DIASTOLE: 4.21 CM (ref 3.5–6)
LEFT VENTRICULAR MASS: 198.97 G
LV LATERAL E/E' RATIO: 7.75 M/S
LV SEPTAL E/E' RATIO: 7.75 M/S
MV PEAK A VEL: 0.87 M/S
MV PEAK E VEL: 0.62 M/S
MV STENOSIS PRESSURE HALF TIME: 55.85 MS
MV VALVE AREA P 1/2 METHOD: 3.94 CM2
OHS CV CPX 1 MINUTE RECOVERY HEART RATE: 84 BPM
OHS CV CPX 85 PERCENT MAX PREDICTED HEART RATE MALE: 136
OHS CV CPX ESTIMATED METS: 7
OHS CV CPX MAX PREDICTED HEART RATE: 160
OHS CV CPX PATIENT IS FEMALE: 0
OHS CV CPX PATIENT IS MALE: 1
OHS CV CPX PEAK DIASTOLIC BLOOD PRESSURE: 89 MMHG
OHS CV CPX PEAK HEAR RATE: 137 BPM
OHS CV CPX PEAK RATE PRESSURE PRODUCT: ABNORMAL
OHS CV CPX PEAK SYSTOLIC BLOOD PRESSURE: 212 MMHG
OHS CV CPX PERCENT MAX PREDICTED HEART RATE ACHIEVED: 86
OHS CV CPX RATE PRESSURE PRODUCT PRESENTING: ABNORMAL
PISA TR MAX VEL: 2.73 M/S
PULM VEIN S/D RATIO: 1.76
PV MEAN GRADIENT: 0.67 MMHG
PV PEAK D VEL: 0.38 M/S
PV PEAK S VEL: 0.67 M/S
PV PEAK VELOCITY: 0.96 CM/S
RA MAJOR: 4.4 CM
RA PRESSURE: 3 MMHG
RA WIDTH: 4.36 CM
RIGHT VENTRICLE FREE WALL THICKNESS: 0.9
RIGHT VENTRICULAR END-DIASTOLIC DIMENSION: 3.41 CM
SINUS: 3.88 CM
STJ: 3.58 CM
STRESS ANGINA INDEX: 0
STRESS ECHO POST EXERCISE DUR MIN: 6 MINUTES
SYSTOLIC BLOOD PRESSURE: 139 MMHG
TDI LATERAL: 0.08 M/S
TDI SEPTAL: 0.08 M/S
TDI: 0.08 M/S
TR MAX PG: 30 MMHG
TRICUSPID ANNULAR PLANE SYSTOLIC EXCURSION: 2.59 CM
TV REST PULMONARY ARTERY PRESSURE: 33 MMHG

## 2020-10-01 PROCEDURE — 93320 STRESS ECHO (CUPID ONLY): ICD-10-PCS | Mod: 26,,, | Performed by: INTERNAL MEDICINE

## 2020-10-01 PROCEDURE — 93325 DOPPLER ECHO COLOR FLOW MAPG: CPT | Mod: 26,,, | Performed by: INTERNAL MEDICINE

## 2020-10-01 PROCEDURE — 93325 STRESS ECHO (CUPID ONLY): ICD-10-PCS | Mod: 26,,, | Performed by: INTERNAL MEDICINE

## 2020-10-01 PROCEDURE — 93325 DOPPLER ECHO COLOR FLOW MAPG: CPT

## 2020-10-01 PROCEDURE — 93351 STRESS TTE COMPLETE: CPT | Mod: 26,,, | Performed by: INTERNAL MEDICINE

## 2020-10-01 PROCEDURE — 93351 STRESS ECHO (CUPID ONLY): ICD-10-PCS | Mod: 26,,, | Performed by: INTERNAL MEDICINE

## 2020-10-01 PROCEDURE — 93320 DOPPLER ECHO COMPLETE: CPT | Mod: 26,,, | Performed by: INTERNAL MEDICINE

## 2020-10-05 ENCOUNTER — OFFICE VISIT (OUTPATIENT)
Dept: CARDIOLOGY | Facility: CLINIC | Age: 60
End: 2020-10-05
Payer: COMMERCIAL

## 2020-10-05 VITALS
WEIGHT: 253.31 LBS | OXYGEN SATURATION: 96 % | SYSTOLIC BLOOD PRESSURE: 118 MMHG | DIASTOLIC BLOOD PRESSURE: 72 MMHG | HEART RATE: 72 BPM | HEIGHT: 70 IN | BODY MASS INDEX: 36.26 KG/M2

## 2020-10-05 DIAGNOSIS — Z82.49 FAMILY HISTORY OF CARDIOVASCULAR DISORDER: ICD-10-CM

## 2020-10-05 DIAGNOSIS — E78.00 HYPERCHOLESTEROLEMIA: ICD-10-CM

## 2020-10-05 DIAGNOSIS — I25.10 CAD IN NATIVE ARTERY: ICD-10-CM

## 2020-10-05 DIAGNOSIS — R00.2 PALPITATIONS: Primary | ICD-10-CM

## 2020-10-05 DIAGNOSIS — R94.31 ABNORMAL ECG: ICD-10-CM

## 2020-10-05 DIAGNOSIS — E66.9 OBESITY (BMI 35.0-39.9 WITHOUT COMORBIDITY): ICD-10-CM

## 2020-10-05 PROCEDURE — 99214 PR OFFICE/OUTPT VISIT, EST, LEVL IV, 30-39 MIN: ICD-10-PCS | Mod: S$GLB,,, | Performed by: INTERNAL MEDICINE

## 2020-10-05 PROCEDURE — 99999 PR PBB SHADOW E&M-EST. PATIENT-LVL III: CPT | Mod: PBBFAC,,, | Performed by: INTERNAL MEDICINE

## 2020-10-05 PROCEDURE — 99999 PR PBB SHADOW E&M-EST. PATIENT-LVL III: ICD-10-PCS | Mod: PBBFAC,,, | Performed by: INTERNAL MEDICINE

## 2020-10-05 PROCEDURE — 99214 OFFICE O/P EST MOD 30 MIN: CPT | Mod: S$GLB,,, | Performed by: INTERNAL MEDICINE

## 2020-10-05 RX ORDER — ROSUVASTATIN CALCIUM 5 MG/1
5 TABLET, COATED ORAL NIGHTLY
Qty: 90 TABLET | Refills: 3 | Status: SHIPPED | OUTPATIENT
Start: 2020-10-05 | End: 2021-11-22

## 2020-10-05 NOTE — PROGRESS NOTES
"Subjective:    Patient ID:  Rob Hickey Jr. is a 60 y.o. male who presents for evaluation of Palpitations and Coronary Artery Disease      HPI Pt presents for f/u.  Pt seen as new pt Sept 2020.  Nonsmoker.  Past details as following from 9/20 appt:  Has had palpitations over last year, at random, can last up to an hour.  Associated with sweats.    No associated chest pain sxs.  No dyspnea.   ecg 9/10/20 NSR, nonspecific T wave abnl.  Bother had massive MI age 45.  Sister had MI in her 20's.   He is here.  Stress echo no ischemia, normal LVEF, mild LAE.  Coronary calcium score is low 21 on 9/20 test.  Palpitations controlled.  Wearing [place] holter right now, planning to mail back today.  Denies cp.  No dyspnea.  No acute changes in sxs.  Lipids elevated long time ago 2004.  Last lipid panel 9/20 was good w .        Current Outpatient Medications:     rosuvastatin (CRESTOR) 5 MG tablet, Take 1 tablet (5 mg total) by mouth every evening., Disp: 90 tablet, Rfl: 3      Review of Systems   Constitution: Negative.   HENT: Negative.    Eyes: Negative.    Cardiovascular: Positive for palpitations.   Respiratory: Negative.    Endocrine: Negative.    Hematologic/Lymphatic: Negative.    Skin: Negative.    Musculoskeletal: Negative.    Gastrointestinal: Negative.    Genitourinary: Negative.    Neurological: Negative.    Psychiatric/Behavioral: Negative.    Allergic/Immunologic: Negative.        /72 (BP Location: Left arm, Patient Position: Sitting, BP Method: Large (Manual))   Pulse 72   Ht 5' 10" (1.778 m)   Wt 114.9 kg (253 lb 4.9 oz)   SpO2 96%   BMI 36.35 kg/m²       Wt Readings from Last 3 Encounters:   10/05/20 114.9 kg (253 lb 4.9 oz)   10/01/20 113.4 kg (250 lb)   09/10/20 113.4 kg (250 lb)     Temp Readings from Last 3 Encounters:   08/10/20 98.8 °F (37.1 °C) (Temporal)   12/10/19 98.1 °F (36.7 °C) (Oral)   01/10/17 98.2 °F (36.8 °C) (Tympanic)     BP Readings from Last 3 Encounters:   10/05/20 " 118/72   09/10/20 128/82   08/10/20 (!) 148/80     Pulse Readings from Last 3 Encounters:   10/05/20 72   09/10/20 74   08/10/20 71          Objective:    Physical Exam   Constitutional: He is oriented to person, place, and time. He appears well-developed and well-nourished.   HENT:   Head: Normocephalic.   Neck: Normal range of motion. Neck supple. Normal carotid pulses, no hepatojugular reflux and no JVD present. Carotid bruit is not present. No thyromegaly present.   Cardiovascular: Normal rate, regular rhythm, S1 normal and S2 normal. PMI is not displaced. Exam reveals no S3, no S4, no distant heart sounds, no friction rub, no midsystolic click and no opening snap.   No murmur heard.  Pulses:       Radial pulses are 2+ on the right side and 2+ on the left side.   Pulmonary/Chest: Effort normal and breath sounds normal. He has no wheezes. He has no rales.   Abdominal: Soft. Bowel sounds are normal. He exhibits no distension, no abdominal bruit, no ascites and no mass. There is no abdominal tenderness.   Musculoskeletal:         General: No edema.   Neurological: He is alert and oriented to person, place, and time.   Skin: Skin is warm.   Psychiatric: He has a normal mood and affect. His behavior is normal.   Nursing note and vitals reviewed.      I have reviewed all pertinent labs and cardiac studies.      Chemistry        Component Value Date/Time     09/08/2020 0914    K 4.5 09/08/2020 0914     09/08/2020 0914    CO2 22 (L) 09/08/2020 0914    BUN 12 09/08/2020 0914    CREATININE 1.0 09/08/2020 0914    GLU 72 09/08/2020 0914        Component Value Date/Time    CALCIUM 9.0 09/08/2020 0914    ALKPHOS 58 09/08/2020 0914    AST 24 09/08/2020 0914    ALT 38 09/08/2020 0914    BILITOT 0.4 09/08/2020 0914    ESTGFRAFRICA >60.0 09/08/2020 0914    EGFRNONAA >60.0 09/08/2020 0914          Lab Results   Component Value Date    WBC 8.37 09/08/2020    HGB 14.5 09/08/2020    HCT 47.4 09/08/2020    MCV 94  09/08/2020     09/08/2020       Lab Results   Component Value Date    CHOL 167 09/08/2020    CHOL 215 (H) 02/04/2004     Lab Results   Component Value Date    HDL 53 09/08/2020    HDL 43.0 02/04/2004     Lab Results   Component Value Date    LDLCALC 100.4 09/08/2020    LDLCALC 152.0 (H) 02/04/2004     Lab Results   Component Value Date    TRIG 68 09/08/2020    TRIG 100 02/04/2004     Lab Results   Component Value Date    CHOLHDL 31.7 09/08/2020    CHOLHDL 20.0 02/04/2004       Conclusion    · The left ventricle is normal in size with normal systolic function. The estimated ejection fraction is 55%.  · There is left ventricular concentric remodeling.  · Mild left atrial enlargement.  · The patient's exercise capacity was normal.  · The ECG portion of this study is negative for myocardial ischemia.  · During stress, the following significant arrhythmias were observed: occasional PVCs.  · Indeterminate diastolic function.  · Normal right ventricular systolic function.  · There is right ventricular hypertrophy.  · Normal central venous pressure (3 mmHg).  · The estimated PA systolic pressure is 33 mmHg.  · The sinuses of Valsalva is mildly dilated.  · The stress echo portion of this study is negative for myocardial ischemia.      Study Details    A complete echo was performed using complete 2D, color flow Doppler and spectral Doppler. During the study, the apical, parasternal, subcostal and suprasternal views were captured. No saline (bubble) was used.   Echocardiography Findings    Left Ventricle    The left ventricle is normal in size with normal systolic function. The estimated ejection fraction is 55%. There is left ventricular concentric remodeling. There is indeterminate diastolic pressure.   Right Ventricle    Normal systolic function. There is hypertrophy.   Left Atrium    There is mild left atrial enlargement. Left atrial volume index is 35.2 mL/m2.   Right Atrium    There is normal right atrial size.    Aortic Valve    The aortic valve appears structurally normal. The valve is trileaflet.   Mitral Valve    The mitral valve appears structurally normal. There is trace mitral valve regurgitation. The estimated mitral valve area by pressure half time is 3.94 cm2.   Tricuspid Valve    The tricuspid valve appears structurally normal. The estimated PA systolic pressure is 33 mmHg.   Pulmonic Valve    The pulmonic valve is structurally normal. Mild regurgitation.   IVC/SVC    Normal central venous pressure (3 mm Hg).   Ascending Aorta    The sinuses of Valsalva is mildly dilated.         Conclusion    · The left ventricle is normal in size with normal systolic function. The estimated ejection fraction is 55%.  · There is left ventricular concentric remodeling.  · Mild left atrial enlargement.  · The patient's exercise capacity was normal.  · The ECG portion of this study is negative for myocardial ischemia.  · During stress, the following significant arrhythmias were observed: occasional PVCs.  · Indeterminate diastolic function.  · Normal right ventricular systolic function.  · There is right ventricular hypertrophy.  · Normal central venous pressure (3 mmHg).  · The estimated PA systolic pressure is 33 mmHg.  · The sinuses of Valsalva is mildly dilated.  · The stress echo portion of this study is negative for myocardial ischemia.      Study Details    A complete echo was performed using complete 2D, color flow Doppler and spectral Doppler. During the study, the apical, parasternal, subcostal and suprasternal views were captured. No saline (bubble) was used.   Echocardiography Findings    Left Ventricle    The left ventricle is normal in size with normal systolic function. The estimated ejection fraction is 55%. There is left ventricular concentric remodeling. There is indeterminate diastolic pressure.   Right Ventricle    Normal systolic function. There is hypertrophy.   Left Atrium    There is mild left atrial  enlargement. Left atrial volume index is 35.2 mL/m2.   Right Atrium    There is normal right atrial size.   Aortic Valve    The aortic valve appears structurally normal. The valve is trileaflet.   Mitral Valve    The mitral valve appears structurally normal. There is trace mitral valve regurgitation. The estimated mitral valve area by pressure half time is 3.94 cm2.   Tricuspid Valve    The tricuspid valve appears structurally normal. The estimated PA systolic pressure is 33 mmHg.   Pulmonic Valve    The pulmonic valve is structurally normal. Mild regurgitation.   IVC/SVC    Normal central venous pressure (3 mm Hg).   Ascending Aorta    The sinuses of Valsalva is mildly dilated.           SCORE SUMMARY     Your total calcium score is 21     Incidental findings: Mild dependent changes noted within both lungs.     Impression:     Your total calcium score is 21.  Low to Moderate coronary heart disease risk        Electronically signed by: Ector Willis DO  Date:                                            09/21/2020  Time:                                           16:06        Assessment:       1. Palpitations    2. Abnormal ECG    3. Family history of cardiovascular disorder    4. Obesity (BMI 35.0-39.9 without comorbidity)    5. CAD in native artery    6. Hypercholesterolemia         Plan:             Stable cardiovascular conditions at present time on current medical treatment.  Discussed indications for statin tx with mild elevation of calcium score.  Risks/benefits discussed.  Start low dose Rosuvastatin 5 mg.  Recheck lipids 2 months and strive for LDL <70.  Reviewed all tests and above medical conditions with patient in detail and formulated treatment plan.  Continue optimal medical treatment for cardiovascular conditions.  Cardiac low salt diet discussed.  Daily exercise encouraged, goal 30 +  minutes aerobic exercise as tolerated.  Maintaining healthy weight and weight loss goals (if needed) were discussed in  clinic.  Phone review for Zio holter results.  F/u in 6 months.

## 2020-10-17 ENCOUNTER — TELEPHONE (OUTPATIENT)
Dept: CARDIOLOGY | Facility: HOSPITAL | Age: 60
End: 2020-10-17

## 2020-10-17 DIAGNOSIS — R94.31 ABNORMAL ECG: ICD-10-CM

## 2020-10-17 DIAGNOSIS — I47.10 SVT (SUPRAVENTRICULAR TACHYCARDIA): Primary | ICD-10-CM

## 2020-10-17 DIAGNOSIS — R00.2 PALPITATIONS: ICD-10-CM

## 2020-10-17 DIAGNOSIS — I25.10 CAD IN NATIVE ARTERY: ICD-10-CM

## 2020-10-17 RX ORDER — METOPROLOL SUCCINATE 50 MG/1
50 TABLET, EXTENDED RELEASE ORAL DAILY
Qty: 30 TABLET | Refills: 11 | Status: ON HOLD | OUTPATIENT
Start: 2020-10-17 | End: 2021-06-28 | Stop reason: HOSPADM

## 2020-10-17 NOTE — TELEPHONE ENCOUNTER
Please call pt  Zio holter has some very short lasting arrhythmias that seem benign.  Start Toprol xl 50 mg qd.  This will help control HR and his palpitation sxs.  Schedule 1 week Zio Holter approximately 6 weeks after starting Toprol.    Dr Grajeda

## 2020-10-19 ENCOUNTER — TELEPHONE (OUTPATIENT)
Dept: CARDIOLOGY | Facility: CLINIC | Age: 60
End: 2020-10-19

## 2020-10-19 NOTE — TELEPHONE ENCOUNTER
Called in speak with pt about Zio results but call was unanswered. Will attempt to contact again.

## 2020-10-21 ENCOUNTER — TELEPHONE (OUTPATIENT)
Dept: CARDIOLOGY | Facility: CLINIC | Age: 60
End: 2020-10-21

## 2020-10-22 NOTE — TELEPHONE ENCOUNTER
Successfully spoke with pt in regards to Zio results and the start of medication. Holter scheduled in 6 wk for 12/3. Understanding was verbalized with no questions.

## 2020-10-30 ENCOUNTER — PATIENT MESSAGE (OUTPATIENT)
Dept: ADMINISTRATIVE | Facility: HOSPITAL | Age: 60
End: 2020-10-30

## 2020-12-03 ENCOUNTER — HOSPITAL ENCOUNTER (OUTPATIENT)
Dept: CARDIOLOGY | Facility: HOSPITAL | Age: 60
Discharge: HOME | End: 2020-12-03
Attending: INTERNAL MEDICINE
Payer: COMMERCIAL

## 2020-12-03 DIAGNOSIS — R00.2 PALPITATIONS: ICD-10-CM

## 2020-12-03 DIAGNOSIS — I47.10 SVT (SUPRAVENTRICULAR TACHYCARDIA): ICD-10-CM

## 2020-12-03 DIAGNOSIS — R94.31 ABNORMAL ECG: ICD-10-CM

## 2020-12-03 DIAGNOSIS — I25.10 CAD IN NATIVE ARTERY: ICD-10-CM

## 2020-12-03 PROCEDURE — 0298T CV CARDIAC MONITOR - 3-14 DAY ADULT (CUPID ONLY): ICD-10-PCS | Mod: ,,, | Performed by: INTERNAL MEDICINE

## 2020-12-03 PROCEDURE — 0296T CV CARDIAC MONITOR - 3-14 DAY ADULT (CUPID ONLY): ICD-10-PCS | Mod: ,,, | Performed by: INTERNAL MEDICINE

## 2020-12-03 PROCEDURE — 0298T CV CARDIAC MONITOR - 3-14 DAY ADULT (CUPID ONLY): CPT | Mod: ,,, | Performed by: INTERNAL MEDICINE

## 2020-12-03 PROCEDURE — 0296T CV CARDIAC MONITOR - 3-14 DAY ADULT (CUPID ONLY): CPT | Mod: ,,, | Performed by: INTERNAL MEDICINE

## 2020-12-15 ENCOUNTER — LAB VISIT (OUTPATIENT)
Dept: LAB | Facility: HOSPITAL | Age: 60
End: 2020-12-15
Attending: INTERNAL MEDICINE
Payer: COMMERCIAL

## 2020-12-15 DIAGNOSIS — I25.10 CAD IN NATIVE ARTERY: ICD-10-CM

## 2020-12-15 LAB
ALBUMIN SERPL BCP-MCNC: 3.9 G/DL (ref 3.5–5.2)
ALP SERPL-CCNC: 52 U/L (ref 55–135)
ALT SERPL W/O P-5'-P-CCNC: 37 U/L (ref 10–44)
ANION GAP SERPL CALC-SCNC: 8 MMOL/L (ref 8–16)
AST SERPL-CCNC: 24 U/L (ref 10–40)
BILIRUB SERPL-MCNC: 0.5 MG/DL (ref 0.1–1)
BUN SERPL-MCNC: 13 MG/DL (ref 6–20)
CALCIUM SERPL-MCNC: 8.5 MG/DL (ref 8.7–10.5)
CHLORIDE SERPL-SCNC: 108 MMOL/L (ref 95–110)
CHOLEST SERPL-MCNC: 102 MG/DL (ref 120–199)
CHOLEST/HDLC SERPL: 2.4 {RATIO} (ref 2–5)
CO2 SERPL-SCNC: 24 MMOL/L (ref 23–29)
CREAT SERPL-MCNC: 1 MG/DL (ref 0.5–1.4)
EST. GFR  (AFRICAN AMERICAN): >60 ML/MIN/1.73 M^2
EST. GFR  (NON AFRICAN AMERICAN): >60 ML/MIN/1.73 M^2
GLUCOSE SERPL-MCNC: 60 MG/DL (ref 70–110)
HDLC SERPL-MCNC: 43 MG/DL (ref 40–75)
HDLC SERPL: 42.2 % (ref 20–50)
LDLC SERPL CALC-MCNC: 51.8 MG/DL (ref 63–159)
NONHDLC SERPL-MCNC: 59 MG/DL
POTASSIUM SERPL-SCNC: 4.4 MMOL/L (ref 3.5–5.1)
PROT SERPL-MCNC: 6.8 G/DL (ref 6–8.4)
SODIUM SERPL-SCNC: 140 MMOL/L (ref 136–145)
TRIGL SERPL-MCNC: 36 MG/DL (ref 30–150)

## 2020-12-15 PROCEDURE — 36415 COLL VENOUS BLD VENIPUNCTURE: CPT | Mod: PO

## 2020-12-15 PROCEDURE — 80061 LIPID PANEL: CPT

## 2020-12-15 PROCEDURE — 80053 COMPREHEN METABOLIC PANEL: CPT

## 2020-12-17 ENCOUNTER — TELEPHONE (OUTPATIENT)
Dept: CARDIOLOGY | Facility: CLINIC | Age: 60
End: 2020-12-17

## 2020-12-18 NOTE — TELEPHONE ENCOUNTER
Please call pt.  Lipids are perfect.  Stay on current dose of Rosuvastatin.  F/u next appt 4/21.    Dr Grajeda

## 2020-12-18 NOTE — TELEPHONE ENCOUNTER
Spoke with pt and informed him,    Lipids are perfect.   Stay on current dose of Rosuvastatin.   F/u next appt 4/21.  Understanding was verbalized with no questions/concerns.

## 2021-01-04 ENCOUNTER — TELEPHONE (OUTPATIENT)
Dept: CARDIOLOGY | Facility: CLINIC | Age: 61
End: 2021-01-04

## 2021-01-04 ENCOUNTER — DOCUMENTATION ONLY (OUTPATIENT)
Dept: CARDIOLOGY | Facility: CLINIC | Age: 61
End: 2021-01-04

## 2021-01-04 DIAGNOSIS — I47.10 SVT (SUPRAVENTRICULAR TACHYCARDIA): Primary | ICD-10-CM

## 2021-01-08 ENCOUNTER — TELEPHONE (OUTPATIENT)
Dept: CARDIOLOGY | Facility: CLINIC | Age: 61
End: 2021-01-08

## 2021-02-11 DIAGNOSIS — R00.2 PALPITATIONS: Primary | ICD-10-CM

## 2021-02-24 ENCOUNTER — OFFICE VISIT (OUTPATIENT)
Dept: CARDIOLOGY | Facility: CLINIC | Age: 61
End: 2021-02-24
Payer: COMMERCIAL

## 2021-02-24 ENCOUNTER — HOSPITAL ENCOUNTER (OUTPATIENT)
Dept: CARDIOLOGY | Facility: HOSPITAL | Age: 61
Discharge: HOME | End: 2021-02-24
Attending: INTERNAL MEDICINE
Payer: COMMERCIAL

## 2021-02-24 VITALS
DIASTOLIC BLOOD PRESSURE: 80 MMHG | HEART RATE: 60 BPM | BODY MASS INDEX: 36.99 KG/M2 | HEIGHT: 70 IN | WEIGHT: 258.38 LBS | SYSTOLIC BLOOD PRESSURE: 120 MMHG

## 2021-02-24 DIAGNOSIS — R00.2 PALPITATIONS: ICD-10-CM

## 2021-02-24 DIAGNOSIS — I47.10 SVT (SUPRAVENTRICULAR TACHYCARDIA): ICD-10-CM

## 2021-02-24 PROCEDURE — 99999 PR PBB SHADOW E&M-EST. PATIENT-LVL IV: CPT | Mod: PBBFAC,,, | Performed by: INTERNAL MEDICINE

## 2021-02-24 PROCEDURE — 99204 PR OFFICE/OUTPT VISIT, NEW, LEVL IV, 45-59 MIN: ICD-10-PCS | Mod: S$GLB,,, | Performed by: INTERNAL MEDICINE

## 2021-02-24 PROCEDURE — 93010 ELECTROCARDIOGRAM REPORT: CPT | Mod: ,,, | Performed by: INTERNAL MEDICINE

## 2021-02-24 PROCEDURE — 93005 ELECTROCARDIOGRAM TRACING: CPT

## 2021-02-24 PROCEDURE — 93010 EKG 12-LEAD: ICD-10-PCS | Mod: ,,, | Performed by: INTERNAL MEDICINE

## 2021-02-24 PROCEDURE — 99204 OFFICE O/P NEW MOD 45 MIN: CPT | Mod: S$GLB,,, | Performed by: INTERNAL MEDICINE

## 2021-02-24 PROCEDURE — 99999 PR PBB SHADOW E&M-EST. PATIENT-LVL IV: ICD-10-PCS | Mod: PBBFAC,,, | Performed by: INTERNAL MEDICINE

## 2021-03-15 DIAGNOSIS — Z00.6 RESEARCH EXAM: Primary | ICD-10-CM

## 2021-05-03 ENCOUNTER — PATIENT OUTREACH (OUTPATIENT)
Dept: ADMINISTRATIVE | Facility: OTHER | Age: 61
End: 2021-05-03

## 2021-05-04 ENCOUNTER — OFFICE VISIT (OUTPATIENT)
Dept: CARDIOLOGY | Facility: CLINIC | Age: 61
End: 2021-05-04
Payer: COMMERCIAL

## 2021-05-04 ENCOUNTER — TELEPHONE (OUTPATIENT)
Dept: CARDIOLOGY | Facility: CLINIC | Age: 61
End: 2021-05-04

## 2021-05-04 VITALS
BODY MASS INDEX: 36.49 KG/M2 | HEIGHT: 70 IN | DIASTOLIC BLOOD PRESSURE: 74 MMHG | WEIGHT: 254.88 LBS | HEART RATE: 67 BPM | SYSTOLIC BLOOD PRESSURE: 120 MMHG | OXYGEN SATURATION: 96 %

## 2021-05-04 DIAGNOSIS — R94.31 ABNORMAL ECG: ICD-10-CM

## 2021-05-04 DIAGNOSIS — E66.9 OBESITY (BMI 35.0-39.9 WITHOUT COMORBIDITY): ICD-10-CM

## 2021-05-04 DIAGNOSIS — R00.2 PALPITATIONS: ICD-10-CM

## 2021-05-04 DIAGNOSIS — Z82.49 FAMILY HISTORY OF CARDIOVASCULAR DISORDER: ICD-10-CM

## 2021-05-04 DIAGNOSIS — E78.00 HYPERCHOLESTEROLEMIA: ICD-10-CM

## 2021-05-04 DIAGNOSIS — I47.10 SVT (SUPRAVENTRICULAR TACHYCARDIA): Primary | ICD-10-CM

## 2021-05-04 DIAGNOSIS — I25.10 CAD IN NATIVE ARTERY: ICD-10-CM

## 2021-05-04 PROCEDURE — 99999 PR PBB SHADOW E&M-EST. PATIENT-LVL III: ICD-10-PCS | Mod: PBBFAC,,, | Performed by: INTERNAL MEDICINE

## 2021-05-04 PROCEDURE — 99214 OFFICE O/P EST MOD 30 MIN: CPT | Mod: S$GLB,,, | Performed by: INTERNAL MEDICINE

## 2021-05-04 PROCEDURE — 99214 PR OFFICE/OUTPT VISIT, EST, LEVL IV, 30-39 MIN: ICD-10-PCS | Mod: S$GLB,,, | Performed by: INTERNAL MEDICINE

## 2021-05-04 PROCEDURE — 99999 PR PBB SHADOW E&M-EST. PATIENT-LVL III: CPT | Mod: PBBFAC,,, | Performed by: INTERNAL MEDICINE

## 2021-05-13 ENCOUNTER — TELEPHONE (OUTPATIENT)
Dept: ELECTROPHYSIOLOGY | Facility: CLINIC | Age: 61
End: 2021-05-13

## 2021-05-14 ENCOUNTER — TELEPHONE (OUTPATIENT)
Dept: ELECTROPHYSIOLOGY | Facility: CLINIC | Age: 61
End: 2021-05-14

## 2021-05-24 ENCOUNTER — PATIENT MESSAGE (OUTPATIENT)
Dept: ELECTROPHYSIOLOGY | Facility: CLINIC | Age: 61
End: 2021-05-24

## 2021-05-24 DIAGNOSIS — I47.10 SVT (SUPRAVENTRICULAR TACHYCARDIA): Primary | ICD-10-CM

## 2021-06-21 ENCOUNTER — TELEPHONE (OUTPATIENT)
Dept: ELECTROPHYSIOLOGY | Facility: CLINIC | Age: 61
End: 2021-06-21

## 2021-06-21 ENCOUNTER — LAB VISIT (OUTPATIENT)
Dept: LAB | Facility: HOSPITAL | Age: 61
End: 2021-06-21
Attending: INTERNAL MEDICINE
Payer: COMMERCIAL

## 2021-06-21 DIAGNOSIS — I47.10 SVT (SUPRAVENTRICULAR TACHYCARDIA): ICD-10-CM

## 2021-06-21 LAB
ANION GAP SERPL CALC-SCNC: 10 MMOL/L (ref 8–16)
APTT BLDCRRT: 26.8 SEC (ref 21–32)
BASOPHILS # BLD AUTO: 0.02 K/UL (ref 0–0.2)
BASOPHILS NFR BLD: 0.2 % (ref 0–1.9)
BUN SERPL-MCNC: 12 MG/DL (ref 6–20)
CALCIUM SERPL-MCNC: 9 MG/DL (ref 8.7–10.5)
CHLORIDE SERPL-SCNC: 109 MMOL/L (ref 95–110)
CO2 SERPL-SCNC: 22 MMOL/L (ref 23–29)
CREAT SERPL-MCNC: 0.9 MG/DL (ref 0.5–1.4)
DIFFERENTIAL METHOD: ABNORMAL
EOSINOPHIL # BLD AUTO: 0.2 K/UL (ref 0–0.5)
EOSINOPHIL NFR BLD: 2.3 % (ref 0–8)
ERYTHROCYTE [DISTWIDTH] IN BLOOD BY AUTOMATED COUNT: 14 % (ref 11.5–14.5)
EST. GFR  (AFRICAN AMERICAN): >60 ML/MIN/1.73 M^2
EST. GFR  (NON AFRICAN AMERICAN): >60 ML/MIN/1.73 M^2
GLUCOSE SERPL-MCNC: 88 MG/DL (ref 70–110)
HCT VFR BLD AUTO: 41.2 % (ref 40–54)
HGB BLD-MCNC: 13.2 G/DL (ref 14–18)
IMM GRANULOCYTES # BLD AUTO: 0.03 K/UL (ref 0–0.04)
IMM GRANULOCYTES NFR BLD AUTO: 0.4 % (ref 0–0.5)
INR PPP: 1 (ref 0.8–1.2)
LYMPHOCYTES # BLD AUTO: 1.8 K/UL (ref 1–4.8)
LYMPHOCYTES NFR BLD: 21.6 % (ref 18–48)
MCH RBC QN AUTO: 28.8 PG (ref 27–31)
MCHC RBC AUTO-ENTMCNC: 32 G/DL (ref 32–36)
MCV RBC AUTO: 90 FL (ref 82–98)
MONOCYTES # BLD AUTO: 0.6 K/UL (ref 0.3–1)
MONOCYTES NFR BLD: 7.4 % (ref 4–15)
NEUTROPHILS # BLD AUTO: 5.7 K/UL (ref 1.8–7.7)
NEUTROPHILS NFR BLD: 68.1 % (ref 38–73)
NRBC BLD-RTO: 0 /100 WBC
PLATELET # BLD AUTO: 241 K/UL (ref 150–450)
PMV BLD AUTO: 10.7 FL (ref 9.2–12.9)
POTASSIUM SERPL-SCNC: 4.1 MMOL/L (ref 3.5–5.1)
PROTHROMBIN TIME: 10.6 SEC (ref 9–12.5)
RBC # BLD AUTO: 4.59 M/UL (ref 4.6–6.2)
SODIUM SERPL-SCNC: 141 MMOL/L (ref 136–145)
WBC # BLD AUTO: 8.38 K/UL (ref 3.9–12.7)

## 2021-06-21 PROCEDURE — 36415 COLL VENOUS BLD VENIPUNCTURE: CPT | Performed by: INTERNAL MEDICINE

## 2021-06-21 PROCEDURE — 80048 BASIC METABOLIC PNL TOTAL CA: CPT | Performed by: INTERNAL MEDICINE

## 2021-06-21 PROCEDURE — 85610 PROTHROMBIN TIME: CPT | Performed by: INTERNAL MEDICINE

## 2021-06-21 PROCEDURE — 85730 THROMBOPLASTIN TIME PARTIAL: CPT | Performed by: INTERNAL MEDICINE

## 2021-06-21 PROCEDURE — 85025 COMPLETE CBC W/AUTO DIFF WBC: CPT | Performed by: INTERNAL MEDICINE

## 2021-06-25 ENCOUNTER — TELEPHONE (OUTPATIENT)
Dept: ELECTROPHYSIOLOGY | Facility: CLINIC | Age: 61
End: 2021-06-25

## 2021-06-27 ENCOUNTER — ANESTHESIA EVENT (OUTPATIENT)
Dept: MEDSURG UNIT | Facility: HOSPITAL | Age: 61
End: 2021-06-27
Payer: COMMERCIAL

## 2021-06-28 ENCOUNTER — ANESTHESIA (OUTPATIENT)
Dept: MEDSURG UNIT | Facility: HOSPITAL | Age: 61
End: 2021-06-28
Payer: COMMERCIAL

## 2021-06-28 ENCOUNTER — HOSPITAL ENCOUNTER (OUTPATIENT)
Facility: HOSPITAL | Age: 61
Discharge: HOME | End: 2021-06-28
Attending: INTERNAL MEDICINE | Admitting: INTERNAL MEDICINE
Payer: COMMERCIAL

## 2021-06-28 VITALS
WEIGHT: 250 LBS | DIASTOLIC BLOOD PRESSURE: 88 MMHG | BODY MASS INDEX: 35.79 KG/M2 | OXYGEN SATURATION: 100 % | HEIGHT: 70 IN | SYSTOLIC BLOOD PRESSURE: 158 MMHG | HEART RATE: 60 BPM | TEMPERATURE: 97 F | RESPIRATION RATE: 18 BRPM

## 2021-06-28 DIAGNOSIS — I49.9 ARRHYTHMIA: ICD-10-CM

## 2021-06-28 DIAGNOSIS — R94.31 ABNORMAL ECG: ICD-10-CM

## 2021-06-28 DIAGNOSIS — I47.10 SVT (SUPRAVENTRICULAR TACHYCARDIA): ICD-10-CM

## 2021-06-28 PROCEDURE — D9220A PRA ANESTHESIA: ICD-10-PCS | Mod: ANES,,, | Performed by: ANESTHESIOLOGY

## 2021-06-28 PROCEDURE — 93621 COMP EP EVL L PAC&REC C SINS: CPT | Performed by: INTERNAL MEDICINE

## 2021-06-28 PROCEDURE — 37000008 HC ANESTHESIA 1ST 15 MINUTES: Performed by: INTERNAL MEDICINE

## 2021-06-28 PROCEDURE — 93010 EKG 12-LEAD: ICD-10-PCS | Mod: 76,,, | Performed by: INTERNAL MEDICINE

## 2021-06-28 PROCEDURE — D9220A PRA ANESTHESIA: ICD-10-PCS | Mod: CRNA,,, | Performed by: NURSE ANESTHETIST, CERTIFIED REGISTERED

## 2021-06-28 PROCEDURE — 93653 COMPRE EP EVAL TX SVT: CPT | Mod: ,,, | Performed by: INTERNAL MEDICINE

## 2021-06-28 PROCEDURE — C1730 CATH, EP, 19 OR FEW ELECT: HCPCS | Performed by: INTERNAL MEDICINE

## 2021-06-28 PROCEDURE — 25000003 PHARM REV CODE 250: Performed by: NURSE ANESTHETIST, CERTIFIED REGISTERED

## 2021-06-28 PROCEDURE — 93653 PR ELECTROPHYS EVAL, COMPREHEN, W/SUPRAVENT TACHYCARD TRMT: ICD-10-PCS | Mod: ,,, | Performed by: INTERNAL MEDICINE

## 2021-06-28 PROCEDURE — 25000003 PHARM REV CODE 250: Performed by: INTERNAL MEDICINE

## 2021-06-28 PROCEDURE — C1894 INTRO/SHEATH, NON-LASER: HCPCS | Performed by: INTERNAL MEDICINE

## 2021-06-28 PROCEDURE — 27201423 OPTIME MED/SURG SUP & DEVICES STERILE SUPPLY: Performed by: INTERNAL MEDICINE

## 2021-06-28 PROCEDURE — 63600175 PHARM REV CODE 636 W HCPCS: Performed by: NURSE ANESTHETIST, CERTIFIED REGISTERED

## 2021-06-28 PROCEDURE — 93613 INTRACARDIAC EPHYS 3D MAPG: CPT | Performed by: INTERNAL MEDICINE

## 2021-06-28 PROCEDURE — D9220A PRA ANESTHESIA: Mod: CRNA,,, | Performed by: NURSE ANESTHETIST, CERTIFIED REGISTERED

## 2021-06-28 PROCEDURE — D9220A PRA ANESTHESIA: Mod: ANES,,, | Performed by: ANESTHESIOLOGY

## 2021-06-28 PROCEDURE — 37000009 HC ANESTHESIA EA ADD 15 MINS: Performed by: INTERNAL MEDICINE

## 2021-06-28 PROCEDURE — 93010 ELECTROCARDIOGRAM REPORT: CPT | Mod: ,,, | Performed by: INTERNAL MEDICINE

## 2021-06-28 PROCEDURE — 93613 PR INTRACARD ELECTROPHYS 3-DIMENS MAPPING: ICD-10-PCS | Mod: ,,, | Performed by: INTERNAL MEDICINE

## 2021-06-28 PROCEDURE — 93010 ELECTROCARDIOGRAM REPORT: CPT | Mod: 76,,, | Performed by: INTERNAL MEDICINE

## 2021-06-28 PROCEDURE — C1733 CATH, EP, OTHR THAN COOL-TIP: HCPCS | Performed by: INTERNAL MEDICINE

## 2021-06-28 PROCEDURE — 93613 INTRACARDIAC EPHYS 3D MAPG: CPT | Mod: ,,, | Performed by: INTERNAL MEDICINE

## 2021-06-28 PROCEDURE — 93005 ELECTROCARDIOGRAM TRACING: CPT

## 2021-06-28 PROCEDURE — 93005 ELECTROCARDIOGRAM TRACING: CPT | Mod: 59

## 2021-06-28 PROCEDURE — 93621: ICD-10-PCS | Mod: 26,,, | Performed by: INTERNAL MEDICINE

## 2021-06-28 PROCEDURE — 93621 COMP EP EVL L PAC&REC C SINS: CPT | Mod: 26,,, | Performed by: INTERNAL MEDICINE

## 2021-06-28 PROCEDURE — 93653 COMPRE EP EVAL TX SVT: CPT | Performed by: INTERNAL MEDICINE

## 2021-06-28 RX ORDER — DIPHENHYDRAMINE HYDROCHLORIDE 50 MG/ML
25 INJECTION INTRAMUSCULAR; INTRAVENOUS EVERY 6 HOURS PRN
Status: DISCONTINUED | OUTPATIENT
Start: 2021-06-28 | End: 2021-06-28 | Stop reason: HOSPADM

## 2021-06-28 RX ORDER — HEPARIN SOD,PORCINE/0.9 % NACL 1000/500ML
INTRAVENOUS SOLUTION INTRAVENOUS
Status: DISCONTINUED | OUTPATIENT
Start: 2021-06-28 | End: 2021-06-28 | Stop reason: HOSPADM

## 2021-06-28 RX ORDER — HYDROMORPHONE HYDROCHLORIDE 1 MG/ML
0.2 INJECTION, SOLUTION INTRAMUSCULAR; INTRAVENOUS; SUBCUTANEOUS EVERY 5 MIN PRN
Status: DISCONTINUED | OUTPATIENT
Start: 2021-06-28 | End: 2021-06-28 | Stop reason: HOSPADM

## 2021-06-28 RX ORDER — FENTANYL CITRATE 50 UG/ML
25 INJECTION, SOLUTION INTRAMUSCULAR; INTRAVENOUS EVERY 5 MIN PRN
Status: DISCONTINUED | OUTPATIENT
Start: 2021-06-28 | End: 2021-06-28 | Stop reason: HOSPADM

## 2021-06-28 RX ORDER — ONDANSETRON 2 MG/ML
4 INJECTION INTRAMUSCULAR; INTRAVENOUS ONCE AS NEEDED
Status: DISCONTINUED | OUTPATIENT
Start: 2021-06-28 | End: 2021-06-28 | Stop reason: HOSPADM

## 2021-06-28 RX ORDER — LIDOCAINE HYDROCHLORIDE 20 MG/ML
INJECTION INTRAVENOUS
Status: DISCONTINUED | OUTPATIENT
Start: 2021-06-28 | End: 2021-06-28

## 2021-06-28 RX ORDER — MIDAZOLAM HYDROCHLORIDE 1 MG/ML
INJECTION, SOLUTION INTRAMUSCULAR; INTRAVENOUS
Status: DISCONTINUED | OUTPATIENT
Start: 2021-06-28 | End: 2021-06-28

## 2021-06-28 RX ORDER — ACETAMINOPHEN 325 MG/1
650 TABLET ORAL EVERY 4 HOURS PRN
Status: DISCONTINUED | OUTPATIENT
Start: 2021-06-28 | End: 2021-06-28 | Stop reason: HOSPADM

## 2021-06-28 RX ORDER — PROPOFOL 10 MG/ML
VIAL (ML) INTRAVENOUS CONTINUOUS PRN
Status: DISCONTINUED | OUTPATIENT
Start: 2021-06-28 | End: 2021-06-28

## 2021-06-28 RX ORDER — LIDOCAINE HYDROCHLORIDE 20 MG/ML
INJECTION, SOLUTION INFILTRATION; PERINEURAL
Status: DISCONTINUED | OUTPATIENT
Start: 2021-06-28 | End: 2021-06-28 | Stop reason: HOSPADM

## 2021-06-28 RX ADMIN — LIDOCAINE HYDROCHLORIDE 100 MG: 20 INJECTION, SOLUTION INTRAVENOUS at 11:06

## 2021-06-28 RX ADMIN — MIDAZOLAM 2 MG: 1 INJECTION INTRAMUSCULAR; INTRAVENOUS at 11:06

## 2021-06-28 RX ADMIN — PROPOFOL 150 MCG/KG/MIN: 10 INJECTION, EMULSION INTRAVENOUS at 11:06

## 2021-06-28 RX ADMIN — SODIUM CHLORIDE: 0.9 INJECTION, SOLUTION INTRAVENOUS at 11:06

## 2021-09-01 ENCOUNTER — PATIENT MESSAGE (OUTPATIENT)
Dept: CARDIOLOGY | Facility: CLINIC | Age: 61
End: 2021-09-01

## 2021-09-29 ENCOUNTER — OFFICE VISIT (OUTPATIENT)
Dept: CARDIOLOGY | Facility: CLINIC | Age: 61
End: 2021-09-29
Payer: COMMERCIAL

## 2021-09-29 DIAGNOSIS — I47.10 SVT (SUPRAVENTRICULAR TACHYCARDIA): Primary | ICD-10-CM

## 2021-09-29 PROCEDURE — 99214 OFFICE O/P EST MOD 30 MIN: CPT | Mod: 95,,, | Performed by: INTERNAL MEDICINE

## 2021-09-29 PROCEDURE — 99214 PR OFFICE/OUTPT VISIT, EST, LEVL IV, 30-39 MIN: ICD-10-PCS | Mod: 95,,, | Performed by: INTERNAL MEDICINE

## 2021-10-20 ENCOUNTER — LAB VISIT (OUTPATIENT)
Dept: LAB | Facility: HOSPITAL | Age: 61
End: 2021-10-20
Attending: INTERNAL MEDICINE
Payer: COMMERCIAL

## 2021-10-20 DIAGNOSIS — E78.00 HYPERCHOLESTEROLEMIA: ICD-10-CM

## 2021-10-20 LAB
ALBUMIN SERPL BCP-MCNC: 3.9 G/DL (ref 3.5–5.2)
ALP SERPL-CCNC: 55 U/L (ref 55–135)
ALT SERPL W/O P-5'-P-CCNC: 30 U/L (ref 10–44)
ANION GAP SERPL CALC-SCNC: 9 MMOL/L (ref 8–16)
AST SERPL-CCNC: 23 U/L (ref 10–40)
BILIRUB SERPL-MCNC: 0.6 MG/DL (ref 0.1–1)
BUN SERPL-MCNC: 14 MG/DL (ref 8–23)
CALCIUM SERPL-MCNC: 9.1 MG/DL (ref 8.7–10.5)
CHLORIDE SERPL-SCNC: 109 MMOL/L (ref 95–110)
CHOLEST SERPL-MCNC: 140 MG/DL (ref 120–199)
CHOLEST/HDLC SERPL: 2.9 {RATIO} (ref 2–5)
CO2 SERPL-SCNC: 25 MMOL/L (ref 23–29)
CREAT SERPL-MCNC: 1 MG/DL (ref 0.5–1.4)
EST. GFR  (AFRICAN AMERICAN): >60 ML/MIN/1.73 M^2
EST. GFR  (NON AFRICAN AMERICAN): >60 ML/MIN/1.73 M^2
GLUCOSE SERPL-MCNC: 106 MG/DL (ref 70–110)
HDLC SERPL-MCNC: 49 MG/DL (ref 40–75)
HDLC SERPL: 35 % (ref 20–50)
LDLC SERPL CALC-MCNC: 80.4 MG/DL (ref 63–159)
NONHDLC SERPL-MCNC: 91 MG/DL
POTASSIUM SERPL-SCNC: 4.1 MMOL/L (ref 3.5–5.1)
PROT SERPL-MCNC: 7 G/DL (ref 6–8.4)
SODIUM SERPL-SCNC: 143 MMOL/L (ref 136–145)
TRIGL SERPL-MCNC: 53 MG/DL (ref 30–150)

## 2021-10-20 PROCEDURE — 36415 COLL VENOUS BLD VENIPUNCTURE: CPT | Performed by: INTERNAL MEDICINE

## 2021-10-20 PROCEDURE — 80061 LIPID PANEL: CPT | Performed by: INTERNAL MEDICINE

## 2021-10-20 PROCEDURE — 80053 COMPREHEN METABOLIC PANEL: CPT | Performed by: INTERNAL MEDICINE

## 2022-01-11 ENCOUNTER — OFFICE VISIT (OUTPATIENT)
Dept: CARDIOLOGY | Facility: CLINIC | Age: 62
End: 2022-01-11
Payer: COMMERCIAL

## 2022-01-11 VITALS
WEIGHT: 263.44 LBS | SYSTOLIC BLOOD PRESSURE: 138 MMHG | DIASTOLIC BLOOD PRESSURE: 82 MMHG | HEART RATE: 82 BPM | RESPIRATION RATE: 16 BRPM | OXYGEN SATURATION: 95 % | BODY MASS INDEX: 37.71 KG/M2 | HEIGHT: 70 IN

## 2022-01-11 DIAGNOSIS — Z82.49 FAMILY HISTORY OF CARDIOVASCULAR DISORDER: ICD-10-CM

## 2022-01-11 DIAGNOSIS — R00.2 PALPITATIONS: ICD-10-CM

## 2022-01-11 DIAGNOSIS — E66.9 OBESITY (BMI 35.0-39.9 WITHOUT COMORBIDITY): ICD-10-CM

## 2022-01-11 DIAGNOSIS — I10 ESSENTIAL HYPERTENSION: ICD-10-CM

## 2022-01-11 DIAGNOSIS — R94.31 ABNORMAL ECG: ICD-10-CM

## 2022-01-11 DIAGNOSIS — E78.00 HYPERCHOLESTEROLEMIA: ICD-10-CM

## 2022-01-11 DIAGNOSIS — I47.10 SVT (SUPRAVENTRICULAR TACHYCARDIA): ICD-10-CM

## 2022-01-11 DIAGNOSIS — I25.10 CAD IN NATIVE ARTERY: Primary | ICD-10-CM

## 2022-01-11 PROCEDURE — 99999 PR PBB SHADOW E&M-EST. PATIENT-LVL III: CPT | Mod: PBBFAC,,, | Performed by: INTERNAL MEDICINE

## 2022-01-11 PROCEDURE — 99999 PR PBB SHADOW E&M-EST. PATIENT-LVL III: ICD-10-PCS | Mod: PBBFAC,,, | Performed by: INTERNAL MEDICINE

## 2022-01-11 PROCEDURE — 99214 PR OFFICE/OUTPT VISIT, EST, LEVL IV, 30-39 MIN: ICD-10-PCS | Mod: S$GLB,,, | Performed by: INTERNAL MEDICINE

## 2022-01-11 PROCEDURE — 99214 OFFICE O/P EST MOD 30 MIN: CPT | Mod: S$GLB,,, | Performed by: INTERNAL MEDICINE

## 2022-01-11 RX ORDER — METOPROLOL SUCCINATE 50 MG/1
50 TABLET, EXTENDED RELEASE ORAL DAILY
Qty: 90 TABLET | Refills: 4 | Status: SHIPPED | OUTPATIENT
Start: 2022-01-11 | End: 2023-08-04

## 2022-01-11 NOTE — PROGRESS NOTES
"Subjective:    Patient ID:  Rob Hickey Jr. is a 61 y.o. male who presents for evaluation of Hypertension, Hyperlipidemia, Coronary Artery Disease, and Risk Factor Management For Atherosclerosis      HPI Pt presents for eval.  His current med conditions include CAD, HTN, hyperlipidemia, obesity, SVT.  Nonsmoker.   Past hx pertinent for following:   ecg 9/10/20 NSR, nonspecific T wave abnl.  Bother had massive MI age 45.  Sister had MI in her 20's.   Stress echo Oct 2020  no ischemia, normal LVEF, mild LAE.  Coronary calcium score is low 21 on 9/20 test.  Zio holter 9/20 and 12/20 showed runs of SVT.  Now here.  S/p EP study/ablation 6/21 for SVT w Dr. Daniel.  No palpitations.  BP above goal.  No longer on Toprol; states it was stopped post ablation.  Not enough exercise.  Weight up.  Lipids controlled on statin tx.  ecg 6/28/21 NSR, normal ecg.      Past Medical History:   Diagnosis Date    CAD in native artery 10/5/2020    Hypercholesterolemia 10/5/2020    Obesity (BMI 35.0-39.9 without comorbidity) 8/10/2020    SVT (supraventricular tachycardia) 5/4/2021       Current Outpatient Medications:     metoprolol succinate (TOPROL-XL) 50 MG 24 hr tablet, Take 1 tablet (50 mg total) by mouth once daily., Disp: 90 tablet, Rfl: 4    rosuvastatin (CRESTOR) 5 MG tablet, TAKE 1 TABLET BY MOUTH EVERY DAY IN THE EVENING, Disp: 90 tablet, Rfl: 3      Review of Systems   Constitutional: Positive for weight gain.   HENT: Negative.    Eyes: Negative.    Cardiovascular: Negative.    Respiratory: Negative.    Endocrine: Negative.    Hematologic/Lymphatic: Negative.    Skin: Negative.    Musculoskeletal: Negative.    Gastrointestinal: Negative.    Genitourinary: Negative.    Neurological: Negative.    Psychiatric/Behavioral: Negative.    Allergic/Immunologic: Negative.        /82 (BP Location: Left arm, Patient Position: Sitting, BP Method: Large (Manual))   Pulse 82   Resp 16   Ht 5' 10" (1.778 m)   Wt 119.5 kg " (263 lb 7.2 oz)   SpO2 95%   BMI 37.80 kg/m²     Wt Readings from Last 3 Encounters:   01/11/22 119.5 kg (263 lb 7.2 oz)   06/28/21 113.4 kg (250 lb)   05/04/21 115.6 kg (254 lb 13.6 oz)     Temp Readings from Last 3 Encounters:   06/28/21 97.2 °F (36.2 °C) (Temporal)   08/10/20 98.8 °F (37.1 °C) (Temporal)   12/10/19 98.1 °F (36.7 °C) (Oral)     BP Readings from Last 3 Encounters:   01/11/22 138/82   06/28/21 (!) 158/88   05/04/21 120/74     Pulse Readings from Last 3 Encounters:   01/11/22 82   06/28/21 60   05/04/21 67          Objective:    Physical Exam  Vitals and nursing note reviewed.   Constitutional:       Appearance: He is well-developed and well-nourished. He is obese.   HENT:      Head: Normocephalic.   Neck:      Thyroid: No thyromegaly.      Vascular: Normal carotid pulses. No carotid bruit, hepatojugular reflux or JVD.   Cardiovascular:      Rate and Rhythm: Normal rate and regular rhythm.      Chest Wall: PMI is not displaced.      Pulses: No midsystolic click and no opening snap.           Radial pulses are 2+ on the right side and 2+ on the left side.      Heart sounds: S1 normal and S2 normal. Heart sounds not distant. No murmur heard.  No friction rub. No S3 or S4 sounds.    Pulmonary:      Effort: Pulmonary effort is normal.      Breath sounds: Normal breath sounds. No wheezing or rales.   Abdominal:      General: Bowel sounds are normal. There is no distension, ascites or abdominal bruit.      Palpations: Abdomen is soft. There is no mass.      Tenderness: There is no abdominal tenderness.   Musculoskeletal:         General: No edema.      Cervical back: Normal range of motion and neck supple.   Skin:     General: Skin is warm.   Neurological:      Mental Status: He is alert and oriented to person, place, and time.   Psychiatric:         Mood and Affect: Mood and affect normal.         Behavior: Behavior normal.         I have reviewed all pertinent labs and cardiac studies.      Chemistry         Component Value Date/Time     10/20/2021 0804    K 4.1 10/20/2021 0804     10/20/2021 0804    CO2 25 10/20/2021 0804    BUN 14 10/20/2021 0804    CREATININE 1.0 10/20/2021 0804     10/20/2021 0804        Component Value Date/Time    CALCIUM 9.1 10/20/2021 0804    ALKPHOS 55 10/20/2021 0804    AST 23 10/20/2021 0804    ALT 30 10/20/2021 0804    BILITOT 0.6 10/20/2021 0804    ESTGFRAFRICA >60.0 10/20/2021 0804    EGFRNONAA >60.0 10/20/2021 0804        Lab Results   Component Value Date    WBC 8.38 06/21/2021    HGB 13.2 (L) 06/21/2021    HCT 41.2 06/21/2021    MCV 90 06/21/2021     06/21/2021       No results found for: LABA1C, HGBA1C    Lab Results   Component Value Date    CHOL 140 10/20/2021    CHOL 102 (L) 12/15/2020    CHOL 167 09/08/2020     Lab Results   Component Value Date    HDL 49 10/20/2021    HDL 43 12/15/2020    HDL 53 09/08/2020     Lab Results   Component Value Date    LDLCALC 80.4 10/20/2021    LDLCALC 51.8 (L) 12/15/2020    LDLCALC 100.4 09/08/2020     Lab Results   Component Value Date    TRIG 53 10/20/2021    TRIG 36 12/15/2020    TRIG 68 09/08/2020     Lab Results   Component Value Date    CHOLHDL 35.0 10/20/2021    CHOLHDL 42.2 12/15/2020    CHOLHDL 31.7 09/08/2020           Assessment:       1. CAD in native artery    2. SVT (supraventricular tachycardia)    3. Palpitations    4. Hypercholesterolemia    5. Family history of cardiovascular disorder    6. Abnormal ECG    7. Essential hypertension    8. Obesity (BMI 35.0-39.9 without comorbidity)         Plan:             Restart Toprol xl 50 mg qd for HTN control.  Patient advised of indications for above medications, risks/benefits and side effect profile.  Goal BP < 130/80.  Continue med tx for CAD.  Needs weight loss.  Lose 10 pounds by next appt.  Statin tx.  Monitor for any recurrent SVT, s/p ablation.  Cardiac diet discussed.  F/u 6 months with lipids.

## 2022-04-27 ENCOUNTER — PATIENT MESSAGE (OUTPATIENT)
Dept: ADMINISTRATIVE | Facility: HOSPITAL | Age: 62
End: 2022-04-27
Payer: COMMERCIAL

## 2022-05-25 ENCOUNTER — TELEPHONE (OUTPATIENT)
Dept: FAMILY MEDICINE | Facility: CLINIC | Age: 62
End: 2022-05-25
Payer: COMMERCIAL

## 2022-05-26 ENCOUNTER — OFFICE VISIT (OUTPATIENT)
Dept: FAMILY MEDICINE | Facility: CLINIC | Age: 62
End: 2022-05-26
Payer: COMMERCIAL

## 2022-05-26 VITALS
HEIGHT: 70 IN | SYSTOLIC BLOOD PRESSURE: 120 MMHG | BODY MASS INDEX: 37.5 KG/M2 | TEMPERATURE: 97 F | HEART RATE: 81 BPM | OXYGEN SATURATION: 97 % | WEIGHT: 261.94 LBS | DIASTOLIC BLOOD PRESSURE: 70 MMHG

## 2022-05-26 DIAGNOSIS — I10 ESSENTIAL HYPERTENSION: ICD-10-CM

## 2022-05-26 DIAGNOSIS — I25.10 CAD IN NATIVE ARTERY: ICD-10-CM

## 2022-05-26 DIAGNOSIS — F41.8 SITUATIONAL ANXIETY: Primary | ICD-10-CM

## 2022-05-26 DIAGNOSIS — I47.10 SVT (SUPRAVENTRICULAR TACHYCARDIA): ICD-10-CM

## 2022-05-26 DIAGNOSIS — E66.01 SEVERE OBESITY (BMI 35.0-39.9) WITH COMORBIDITY: ICD-10-CM

## 2022-05-26 PROCEDURE — 99999 PR PBB SHADOW E&M-EST. PATIENT-LVL IV: ICD-10-PCS | Mod: PBBFAC,,, | Performed by: FAMILY MEDICINE

## 2022-05-26 PROCEDURE — 99214 PR OFFICE/OUTPT VISIT, EST, LEVL IV, 30-39 MIN: ICD-10-PCS | Mod: S$GLB,,, | Performed by: FAMILY MEDICINE

## 2022-05-26 PROCEDURE — 99214 OFFICE O/P EST MOD 30 MIN: CPT | Mod: S$GLB,,, | Performed by: FAMILY MEDICINE

## 2022-05-26 PROCEDURE — 99999 PR PBB SHADOW E&M-EST. PATIENT-LVL IV: CPT | Mod: PBBFAC,,, | Performed by: FAMILY MEDICINE

## 2022-05-26 RX ORDER — SERTRALINE HYDROCHLORIDE 50 MG/1
50 TABLET, FILM COATED ORAL DAILY
Qty: 30 TABLET | Refills: 5 | Status: SHIPPED | OUTPATIENT
Start: 2022-05-26 | End: 2023-08-04

## 2022-05-26 NOTE — PROGRESS NOTES
Rob Hickey     Chief Complaint   Patient presents with    Stress       History of Present Illness:   Rob Hickey Jr. is a 61 y.o. male who presents today with complaint of having stress. He states he work for the [employer], where he has worked for 38 years.  He reports feeling stressed at work for appx. 2 weeks; he states his stress stems from situations with his .  He states he left work on Monday, May 23, 2022 due to stress. He states he did not reach out to [place] [facility].    He states he feels more anxious with occasional palpitations.    He states he took Zoloft 50 mg daily for treatment of anxiety, depression in August 2015 without problems and with help. He states he is willing to take medication again.    He denies tobacco, alcohol, or illicit drug use.    Otherwise, he denies having fever, chills, fatigue, appetite changes; shortness of breath, cough, wheezing; chest pain, leg swelling; abdominal pain, nausea, vomiting, diarrhea, constipation; unusual urinary symptoms; polydipsia, polyphagia, polyuria, hot or cold intolerance; back pain; headache; depression, homicidal or suicidal thoughts.     He follows with cardiologist Dr. Carlos Grajeda for treatment and surveillance of CAD in native artery, SVT (supraventricular tachycardia), palpitations, hypercholesterolemia, family history of cardiovascular disorder, abnormal ECG, essential hypertension, obesity (BMI 35.0-39.9 without comorbidity) with last visit on January 11, 2022 at which time he advised he restart Toprol xl 50 mg daily for HTN control with 6-month follow up advised.      Current Outpatient Medications   Medication Sig    metoprolol succinate (TOPROL-XL) 50 MG 24 hr tablet Take 1 tablet (50 mg total) by mouth once daily.    rosuvastatin (CRESTOR) 5 MG tablet TAKE 1 TABLET BY MOUTH EVERY DAY IN THE EVENING       Review of Systems   Constitutional: Negative for activity change, appetite change, chills, fatigue and  fever.   Respiratory: Negative for cough, shortness of breath and wheezing.    Cardiovascular: Positive for palpitations. Negative for chest pain and leg swelling.        See history of present illness.   Gastrointestinal: Negative for constipation, diarrhea, nausea and vomiting.   Endocrine: Negative for cold intolerance, heat intolerance, polydipsia, polyphagia and polyuria.   Genitourinary: Negative for difficulty urinating.   Musculoskeletal: Negative for back pain and myalgias.   Neurological: Negative for numbness and headaches.   Psychiatric/Behavioral: Negative for dysphoric mood, sleep disturbance and suicidal ideas. The patient is nervous/anxious.         Negative for homicidal ideas. See history of present illness.       Objective:  Physical Exam  Vitals reviewed.   Constitutional:       General: He is not in acute distress.     Appearance: Normal appearance. He is well-developed. He is obese. He is not ill-appearing, toxic-appearing or diaphoretic.      Comments: Pleasant.   Cardiovascular:      Rate and Rhythm: Normal rate and regular rhythm.      Pulses: Normal pulses.      Heart sounds: Normal heart sounds. No murmur heard.  Pulmonary:      Effort: Pulmonary effort is normal. No respiratory distress.      Breath sounds: Normal breath sounds. No wheezing.   Abdominal:      General: Bowel sounds are normal. There is no distension.      Palpations: Abdomen is soft. There is no mass.      Tenderness: There is no abdominal tenderness. There is no guarding or rebound.   Musculoskeletal:         General: No swelling or tenderness. Normal range of motion.      Cervical back: Normal range of motion and neck supple. No tenderness.      Comments: He is ambulatory without problems.   Lymphadenopathy:      Cervical: No cervical adenopathy.   Neurological:      General: No focal deficit present.      Mental Status: He is alert and oriented to person, place, and time.   Psychiatric:         Mood and Affect: Mood  normal.         Behavior: Behavior normal.         Thought Content: Thought content normal.         Judgment: Judgment normal.         ASSESSMENT:  1. Situational anxiety    2. CAD in native artery    3. SVT (supraventricular tachycardia)    4. Essential hypertension    5. Severe obesity (BMI 35.0-39.9) with comorbidity        PLAN:  Rob was seen today for stress.    Diagnoses and all orders for this visit:    Situational anxiety  -     sertraline (ZOLOFT) 50 MG tablet; Take 1 tablet (50 mg total) by mouth once daily.  -     Ambulatory referral/consult to Social Work; Future    CAD in native artery    SVT (supraventricular tachycardia)    Essential hypertension    Severe obesity (BMI 35.0-39.9) with comorbidity       No labs today.  Continue current medications, follow low sodium, low cholesterol, low carb diet, daily walks.  Zoloft 50 mg daily for anxiety; medication precautions discussed with patient.  Keep follow up with specialists.  Work excuse for 5/23/2022 - 6/17/2022 with return 6/20/2022. I advised patient I will complete work leave form if needed.  Follow up if symptoms worsen or fail to improve. But, keep 8/4/2022 scheduled physical appointment with me.    30 minutes of total time spent on the encounter, which includes face to face time and non-face to face time preparing to see the patient (eg, review of tests), Obtaining and/or reviewing separately obtained history, Documenting clinical information in the electronic or other health record, Independently interpreting results (not separately reported) and communicating results to the patient/family/caregiver, or Care coordination (not separately reported).      Scribe Attestation:   I, Regina Fitch, am scribing for, and in the presence of, Dr. Bruner. I performed the above scribed service and the documentation accurately describes the services I performed. I attest to the accuracy of the note.    I, Dr. Bruner, reviewed documentation as scribed above. I  personally performed the services described in this documentation.  I agree that the record reflects my personal performance and is accurate and complete. . 05/26/2022

## 2022-05-26 NOTE — LETTER
May 26, 2022      [facility]  [address] [place] [address]  Phone: [phone]  Fax: [phone]       Patient: Rob Hickey   YOB: 1960  Date of Visit: 05/26/2022    To Whom It May Concern:    Britton Hickey  was at [facility] on 5/23/22 Thru 6/17/22. The patient may return to work on *6/20/22 with no restrictions. If you have any questions or concerns, or if I can be of further assistance, please do not hesitate to contact me.    Sincerely,    Mary Bruner MD

## 2022-06-12 PROBLEM — F41.8 SITUATIONAL ANXIETY: Status: ACTIVE | Noted: 2022-06-12

## 2022-06-12 PROBLEM — E66.01 SEVERE OBESITY (BMI 35.0-39.9) WITH COMORBIDITY: Status: ACTIVE | Noted: 2022-06-12

## 2022-06-13 ENCOUNTER — PATIENT MESSAGE (OUTPATIENT)
Dept: ADMINISTRATIVE | Facility: HOSPITAL | Age: 62
End: 2022-06-13
Payer: COMMERCIAL

## 2022-06-17 ENCOUNTER — HOSPITAL ENCOUNTER (EMERGENCY)
Facility: HOSPITAL | Age: 62
Discharge: HOME | End: 2022-06-17
Attending: EMERGENCY MEDICINE
Payer: COMMERCIAL

## 2022-06-17 VITALS
HEART RATE: 63 BPM | RESPIRATION RATE: 18 BRPM | BODY MASS INDEX: 37.86 KG/M2 | OXYGEN SATURATION: 98 % | SYSTOLIC BLOOD PRESSURE: 147 MMHG | TEMPERATURE: 99 F | WEIGHT: 263.88 LBS | DIASTOLIC BLOOD PRESSURE: 70 MMHG

## 2022-06-17 DIAGNOSIS — R10.9 RIGHT FLANK PAIN: Primary | ICD-10-CM

## 2022-06-17 DIAGNOSIS — N20.0 NEPHROLITHIASIS: ICD-10-CM

## 2022-06-17 LAB
ALBUMIN SERPL BCP-MCNC: 4.1 G/DL (ref 3.5–5.2)
ALP SERPL-CCNC: 58 U/L (ref 55–135)
ALT SERPL W/O P-5'-P-CCNC: 22 U/L (ref 10–44)
ANION GAP SERPL CALC-SCNC: 11 MMOL/L (ref 8–16)
AST SERPL-CCNC: 23 U/L (ref 10–40)
BASOPHILS # BLD AUTO: 0.03 K/UL (ref 0–0.2)
BASOPHILS NFR BLD: 0.2 % (ref 0–1.9)
BILIRUB SERPL-MCNC: 0.7 MG/DL (ref 0.1–1)
BILIRUB UR QL STRIP: NEGATIVE
BUN SERPL-MCNC: 20 MG/DL (ref 8–23)
CALCIUM SERPL-MCNC: 9.7 MG/DL (ref 8.7–10.5)
CHLORIDE SERPL-SCNC: 107 MMOL/L (ref 95–110)
CLARITY UR: CLEAR
CO2 SERPL-SCNC: 23 MMOL/L (ref 23–29)
COLOR UR: YELLOW
CREAT SERPL-MCNC: 1.6 MG/DL (ref 0.5–1.4)
DIFFERENTIAL METHOD: ABNORMAL
EOSINOPHIL # BLD AUTO: 0 K/UL (ref 0–0.5)
EOSINOPHIL NFR BLD: 0.1 % (ref 0–8)
ERYTHROCYTE [DISTWIDTH] IN BLOOD BY AUTOMATED COUNT: 13.5 % (ref 11.5–14.5)
EST. GFR  (AFRICAN AMERICAN): 53 ML/MIN/1.73 M^2
EST. GFR  (NON AFRICAN AMERICAN): 46 ML/MIN/1.73 M^2
GLUCOSE SERPL-MCNC: 148 MG/DL (ref 70–110)
GLUCOSE UR QL STRIP: NEGATIVE
HCT VFR BLD AUTO: 42.4 % (ref 40–54)
HGB BLD-MCNC: 14.1 G/DL (ref 14–18)
HGB UR QL STRIP: ABNORMAL
IMM GRANULOCYTES # BLD AUTO: 0.1 K/UL (ref 0–0.04)
IMM GRANULOCYTES NFR BLD AUTO: 0.7 % (ref 0–0.5)
KETONES UR QL STRIP: NEGATIVE
LEUKOCYTE ESTERASE UR QL STRIP: NEGATIVE
LYMPHOCYTES # BLD AUTO: 0.9 K/UL (ref 1–4.8)
LYMPHOCYTES NFR BLD: 5.8 % (ref 18–48)
MCH RBC QN AUTO: 29.4 PG (ref 27–31)
MCHC RBC AUTO-ENTMCNC: 33.3 G/DL (ref 32–36)
MCV RBC AUTO: 88 FL (ref 82–98)
MICROSCOPIC COMMENT: ABNORMAL
MONOCYTES # BLD AUTO: 1.1 K/UL (ref 0.3–1)
MONOCYTES NFR BLD: 6.9 % (ref 4–15)
NEUTROPHILS # BLD AUTO: 13.1 K/UL (ref 1.8–7.7)
NEUTROPHILS NFR BLD: 86.3 % (ref 38–73)
NITRITE UR QL STRIP: NEGATIVE
NRBC BLD-RTO: 0 /100 WBC
PH UR STRIP: 6 [PH] (ref 5–8)
PLATELET # BLD AUTO: 248 K/UL (ref 150–450)
PMV BLD AUTO: 10.3 FL (ref 9.2–12.9)
POTASSIUM SERPL-SCNC: 4.3 MMOL/L (ref 3.5–5.1)
PROT SERPL-MCNC: 7.6 G/DL (ref 6–8.4)
PROT UR QL STRIP: ABNORMAL
RBC # BLD AUTO: 4.8 M/UL (ref 4.6–6.2)
RBC #/AREA URNS HPF: 15 /HPF (ref 0–4)
SODIUM SERPL-SCNC: 141 MMOL/L (ref 136–145)
SP GR UR STRIP: 1.02 (ref 1–1.03)
URN SPEC COLLECT METH UR: ABNORMAL
UROBILINOGEN UR STRIP-ACNC: NEGATIVE EU/DL
WBC # BLD AUTO: 15.19 K/UL (ref 3.9–12.7)

## 2022-06-17 PROCEDURE — 80053 COMPREHEN METABOLIC PANEL: CPT | Performed by: EMERGENCY MEDICINE

## 2022-06-17 PROCEDURE — 85025 COMPLETE CBC W/AUTO DIFF WBC: CPT | Performed by: EMERGENCY MEDICINE

## 2022-06-17 PROCEDURE — 81000 URINALYSIS NONAUTO W/SCOPE: CPT | Performed by: EMERGENCY MEDICINE

## 2022-06-17 PROCEDURE — 99284 EMERGENCY DEPT VISIT MOD MDM: CPT | Mod: 25

## 2022-06-17 PROCEDURE — 96361 HYDRATE IV INFUSION ADD-ON: CPT

## 2022-06-17 PROCEDURE — 96360 HYDRATION IV INFUSION INIT: CPT

## 2022-06-17 PROCEDURE — 25000003 PHARM REV CODE 250: Performed by: EMERGENCY MEDICINE

## 2022-06-17 RX ORDER — HYDROCODONE BITARTRATE AND ACETAMINOPHEN 7.5; 325 MG/1; MG/1
1 TABLET ORAL EVERY 6 HOURS PRN
Qty: 11 TABLET | Refills: 0 | Status: SHIPPED | OUTPATIENT
Start: 2022-06-17 | End: 2022-06-22

## 2022-06-17 RX ORDER — PROMETHAZINE HYDROCHLORIDE 25 MG/1
25 TABLET ORAL EVERY 6 HOURS PRN
Qty: 15 TABLET | Refills: 0 | Status: SHIPPED | OUTPATIENT
Start: 2022-06-17 | End: 2023-02-13

## 2022-06-17 RX ORDER — TAMSULOSIN HYDROCHLORIDE 0.4 MG/1
0.4 CAPSULE ORAL DAILY
Qty: 30 CAPSULE | Refills: 0 | Status: SHIPPED | OUTPATIENT
Start: 2022-06-17 | End: 2022-08-04

## 2022-06-17 RX ADMIN — SODIUM CHLORIDE 1000 ML: 0.9 INJECTION, SOLUTION INTRAVENOUS at 09:06

## 2022-06-17 NOTE — ED PROVIDER NOTES
SCRIBE #1 NOTE: I, Richie Rodriguez, am scribing for, and in the presence of, Errol Terry MD. I have scribed the entire note.      History      Chief Complaint   Patient presents with    Flank Pain     Reports R flank pain x 1 day with n/v. Denies urinary changes or fever.     Review of patient's allergies indicates:  No Known Allergies     HPI   HPI    6/17/2022, 8:04 AM   History obtained from the patient      History of Present Illness: Rob Hickey Jr. is a 61 y.o. male patient who presents to the Emergency Department for R flank pain, onset 1 day PTA. Symptoms are constant and moderate in severity. No mitigating or exacerbating factors reported. Associated sxs include n/v. Patient denies any fever, chills, SOB, CP, dysuria, hematuria, weakness, numbness, dizziness, headache, and all other sxs at this time. No prior Tx reported. No further complaints or concerns at this time.     Arrival mode: Personal vehicle    PCP: Mary Bruner MD       Past Medical History:  Past Medical History:   Diagnosis Date    Anxiety disorder, unspecified     CAD in native artery 10/05/2020    Hypercholesterolemia 10/05/2020    Hypertension     Obesity (BMI 35.0-39.9 without comorbidity) 08/10/2020    SVT (supraventricular tachycardia) 05/04/2021       Past Surgical History:  Past Surgical History:   Procedure Laterality Date    None           Family History:  Family History   Problem Relation Age of Onset    No Known Problems Sister     Heart disease Brother 45        MI/CAD    Heart disease Sister 23        MI/CAD    Cancer Neg Hx     Diabetes Neg Hx     Stroke Neg Hx     Hypertension Neg Hx        Social History:  Social History     Tobacco Use    Smoking status: Never Smoker    Smokeless tobacco: Never Used   Substance and Sexual Activity    Alcohol use: No     Alcohol/week: 0.0 standard drinks    Drug use: No    Sexual activity: Yes       ROS   Review of Systems   Constitutional: Negative for chills and  fever.   HENT: Negative for sore throat.    Respiratory: Negative for shortness of breath.    Cardiovascular: Negative for chest pain.   Gastrointestinal: Positive for nausea and vomiting. Negative for diarrhea.   Genitourinary: Positive for flank pain (R). Negative for dysuria and hematuria.   Musculoskeletal: Negative for back pain.   Skin: Negative for rash.   Neurological: Negative for dizziness, weakness, light-headedness, numbness and headaches.   Hematological: Does not bruise/bleed easily.   All other systems reviewed and are negative.    Physical Exam      Initial Vitals [06/17/22 0758]   BP Pulse Resp Temp SpO2   (S) (!) 186/89 69 18 98.6 °F (37 °C) 97 %      MAP       --          Physical Exam  Nursing Notes and Vital Signs Reviewed.  Constitutional: Patient is in no acute distress. Well-developed and well-nourished.  Head: Atraumatic. Normocephalic.  Eyes: PERRL. EOM intact. Conjunctivae are not pale. No scleral icterus.  ENT: Mucous membranes are moist. Oropharynx is clear and symmetric.    Neck: Supple. Full ROM.   Cardiovascular: Regular rate. Regular rhythm. No murmurs, rubs, or gallops. Distal pulses are 2+ and symmetric.  Pulmonary/Chest: No respiratory distress. Clear to auscultation bilaterally. No wheezing or rales.  Abdominal: Soft and non-distended.  There is no tenderness.  No rebound, guarding, or rigidity.   Musculoskeletal: Moves all extremities. No obvious deformities. No edema.  Skin: Warm and dry.  Neurological:  Alert, awake, and appropriate.  Normal speech.  No acute focal neurological deficits are appreciated.  Psychiatric: Normal affect. Good eye contact. Appropriate in content.    ED Course    Procedures  ED Vital Signs:  Vitals:    06/17/22 0758 06/17/22 0815 06/17/22 0824 06/17/22 0900   BP: (S) (!) 186/89 (!) 187/90  (!) 157/74   Pulse: 69 66 70 62   Resp: 18 (!) 23  20   Temp: 98.6 °F (37 °C)      SpO2: 97% 97%  96%   Weight: 119.7 kg (263 lb 14.3 oz)       06/17/22 0930  06/17/22 1000   BP: (!) 156/72 (!) 151/70   Pulse: 62 64   Resp: (!) 21 18   Temp:     SpO2: 96% 97%   Weight:         Abnormal Lab Results:  Labs Reviewed   COMPREHENSIVE METABOLIC PANEL - Abnormal; Notable for the following components:       Result Value    Glucose 148 (*)     Creatinine 1.6 (*)     eGFR if  53 (*)     eGFR if non  46 (*)     All other components within normal limits   URINALYSIS, REFLEX TO URINE CULTURE - Abnormal; Notable for the following components:    Protein, UA Trace (*)     Occult Blood UA 3+ (*)     All other components within normal limits    Narrative:     Specimen Source->Urine   CBC W/ AUTO DIFFERENTIAL - Abnormal; Notable for the following components:    WBC 15.19 (*)     Immature Granulocytes 0.7 (*)     Gran # (ANC) 13.1 (*)     Immature Grans (Abs) 0.10 (*)     Lymph # 0.9 (*)     Mono # 1.1 (*)     Gran % 86.3 (*)     Lymph % 5.8 (*)     All other components within normal limits   URINALYSIS MICROSCOPIC - Abnormal; Notable for the following components:    RBC, UA 15 (*)     All other components within normal limits    Narrative:     Specimen Source->Urine        All Lab Results:  Results for orders placed or performed during the hospital encounter of 06/17/22   Comprehensive Metabolic Panel   Result Value Ref Range    Sodium 141 136 - 145 mmol/L    Potassium 4.3 3.5 - 5.1 mmol/L    Chloride 107 95 - 110 mmol/L    CO2 23 23 - 29 mmol/L    Glucose 148 (H) 70 - 110 mg/dL    BUN 20 8 - 23 mg/dL    Creatinine 1.6 (H) 0.5 - 1.4 mg/dL    Calcium 9.7 8.7 - 10.5 mg/dL    Total Protein 7.6 6.0 - 8.4 g/dL    Albumin 4.1 3.5 - 5.2 g/dL    Total Bilirubin 0.7 0.1 - 1.0 mg/dL    Alkaline Phosphatase 58 55 - 135 U/L    AST 23 10 - 40 U/L    ALT 22 10 - 44 U/L    Anion Gap 11 8 - 16 mmol/L    eGFR if African American 53 (A) >60 mL/min/1.73 m^2    eGFR if non African American 46 (A) >60 mL/min/1.73 m^2   Urinalysis, Reflex to Urine Culture Urine, Clean Catch     Specimen: Urine   Result Value Ref Range    Specimen UA Urine, Clean Catch     Color, UA Yellow Yellow, Straw, Regina    Appearance, UA Clear Clear    pH, UA 6.0 5.0 - 8.0    Specific Gravity, UA 1.020 1.005 - 1.030    Protein, UA Trace (A) Negative    Glucose, UA Negative Negative    Ketones, UA Negative Negative    Bilirubin (UA) Negative Negative    Occult Blood UA 3+ (A) Negative    Nitrite, UA Negative Negative    Urobilinogen, UA Negative <2.0 EU/dL    Leukocytes, UA Negative Negative   CBC auto differential   Result Value Ref Range    WBC 15.19 (H) 3.90 - 12.70 K/uL    RBC 4.80 4.60 - 6.20 M/uL    Hemoglobin 14.1 14.0 - 18.0 g/dL    Hematocrit 42.4 40.0 - 54.0 %    MCV 88 82 - 98 fL    MCH 29.4 27.0 - 31.0 pg    MCHC 33.3 32.0 - 36.0 g/dL    RDW 13.5 11.5 - 14.5 %    Platelets 248 150 - 450 K/uL    MPV 10.3 9.2 - 12.9 fL    Immature Granulocytes 0.7 (H) 0.0 - 0.5 %    Gran # (ANC) 13.1 (H) 1.8 - 7.7 K/uL    Immature Grans (Abs) 0.10 (H) 0.00 - 0.04 K/uL    Lymph # 0.9 (L) 1.0 - 4.8 K/uL    Mono # 1.1 (H) 0.3 - 1.0 K/uL    Eos # 0.0 0.0 - 0.5 K/uL    Baso # 0.03 0.00 - 0.20 K/uL    nRBC 0 0 /100 WBC    Gran % 86.3 (H) 38.0 - 73.0 %    Lymph % 5.8 (L) 18.0 - 48.0 %    Mono % 6.9 4.0 - 15.0 %    Eosinophil % 0.1 0.0 - 8.0 %    Basophil % 0.2 0.0 - 1.9 %    Differential Method Automated    Urinalysis Microscopic   Result Value Ref Range    RBC, UA 15 (H) 0 - 4 /hpf    Microscopic Comment SEE COMMENT      Imaging Results:  Imaging Results          CT Renal Stone Study ABD Pelvis WO (Final result)  Result time 06/17/22 09:05:11    Final result by GREGORIO Ta Sr., MD (06/17/22 09:05:11)                 Impression:      1. There is a 3 mm stone in the right ureterovesical junction. There is another 3 mm stone in the urinary bladder slightly medial to the right ureterovesical junction.  There is a mild amount of right hydroureter and hydronephrosis.  There is a mild amount of right periureteral and perinephric  stranding.  2. There is a 5 mm stone in the collecting system in the inferior pole of the right kidney.  3. There are several oval-shaped hypodense masses associated with the right kidney.  The largest 1 is a 79 mm exophytic hypodense mass off of the superior pole of the right kidney. It has a Hounsfield measurement of 5.  This is characteristic of a cyst.  4. The prostate is heterogeneous in appearance.  5. There is a moderate size umbilical hernia.  The width of the mouth of this hernia measures 35 mm. A loop of small bowel extends into this hernia. There is no evidence of obstruction associated with this hernia.  6. There are mild osteoarthritic changes in the sacroiliac joints.  7. There are mild osteoarthritic changes in the right hip.  8. There is a 6 mm oval shaped area of relative hypodensity in the dome of the liver.  This area has a Hounsfield measurement of -10.  This is characteristic of a cyst.  9. There are streaky opacities in the lingula and left lower lobe.  This is characteristic of subsegmental atelectasis or scarring.  All CT scans at this facility use dose modulation, iterative reconstruction, and/or weight base dosing when appropriate to reduce radiation dose when appropriate to reduce radiation dose to as low as reasonably achievable.      Electronically signed by: Doni Ta MD  Date:    06/17/2022  Time:    09:05             Narrative:    EXAMINATION:  CT RENAL STONE STUDY ABD PELVIS WO    CLINICAL HISTORY:  Flank pain, kidney stone suspected;    TECHNIQUE:  Standard abdomen and pelvis CT protocol without IV contrast was performed.  There is a small amount of hyperdense material within the small bowel.    COMPARISON:  None    FINDINGS:  Finding: The size of the heart is within normal limits.  There are streaky opacities in the lingula and left lower lobe.  There are mild dependent atelectatic changes in both lungs.  There is no pneumothorax or pleural effusion.    There is a 6 mm oval  shaped area of relative hypodensity in the dome of the liver.  This area has a Hounsfield measurement of -10.  The gallbladder, pancreas, spleen, adrenals, and left kidney are normal in appearance. The left ureter is normal in appearance.  There is a 3 mm stone in the right ureterovesical junction.  There is another 3 mm stone in the urinary bladder slightly medial to the right ureterovesical junction.  There is a mild amount of right hydroureter and hydronephrosis.  There is a mild amount of right periureteral and perinephric stranding.  There is a 5 mm stone in the collecting system in the inferior pole of the right kidney.  There are several oval-shaped hypodense masses associated with the right kidney.  The largest 1 is a 79 mm exophytic hypodense mass off of the superior pole of the right kidney.  It has a Hounsfield measurement of 5.  The prostate is heterogeneous in appearance.  The appendix is normal in appearance.  There is a moderate amount of  diverticulosis in the descending and sigmoid portions of the colon.  There is no free fluid within the abdomen or pelvis. There is no pneumoperitoneum.  There is a moderate size umbilical hernia.  The width of the mouth of this hernia measures 35 mm.  A loop of small bowel extends into this hernia.  There is no evidence of obstruction associated with this hernia.  There are mild osteoarthritic changes in the sacroiliac joints.  There are mild osteoarthritic changes in the right hip.                                        The Emergency Provider reviewed the vital signs and test results, which are outlined above.    ED Discussion     10:42 AM: Discussed pt's case with Dr. Mccain (Urology), who recommends starting the pt on abx with close outpatient Urology follow up if the pt's pain is well-controlled in the ED.    10:43 AM: Reassessed pt at this time. Pt states that his pain has resolved at this time. Discussed with pt all pertinent ED information and results.  Discussed pt dx and plan of tx. Gave pt all f/u and return to the ED instructions. All questions and concerns were addressed at this time. Pt expresses understanding of information and instructions, and is comfortable with plan to discharge. Pt is stable for discharge.    I discussed with patient and/or family/caretaker that evaluation in the ED does not suggest any emergent or life threatening medical conditions requiring immediate intervention beyond what was provided in the ED, and I believe patient is safe for discharge.  Regardless, an unremarkable evaluation in the ED does not preclude the development or presence of a serious of life threatening condition. As such, patient was instructed to return immediately for any worsening or change in current symptoms.         ED Medication(s):  Medications   sodium chloride 0.9% bolus 1,000 mL (1,000 mLs Intravenous New Bag 6/17/22 0948)        Follow-up Information     Mary Bruner MD.    Specialty: Family Medicine  Contact information:  [address] [place] [address]  [phone]                        New Prescriptions    HYDROCODONE-ACETAMINOPHEN (NORCO) 7.5-325 MG PER TABLET    Take 1 tablet by mouth every 6 (six) hours as needed.    PROMETHAZINE (PHENERGAN) 25 MG TABLET    Take 1 tablet (25 mg total) by mouth every 6 (six) hours as needed for Nausea.    TAMSULOSIN (FLOMAX) 0.4 MG CAP    Take 1 capsule (0.4 mg total) by mouth once daily.         Medical Decision Making    Medical Decision Making:   Clinical Tests:   Lab Tests: Ordered and Reviewed  Radiological Study: Ordered and Reviewed           Scribe Attestation:   Scribe #1: I performed the above scribed service and the documentation accurately describes the services I performed. I attest to the accuracy of the note.    Attending:   Physician Attestation Statement for Scribe #1: I, Errol Terry MD, personally performed the services described in this documentation, as scribed by Richie Rodriguez in  my presence, and it is both accurate and complete.          Clinical Impression       ICD-10-CM ICD-9-CM   1. Right flank pain  R10.9 789.09   2. Nephrolithiasis  N20.0 592.0       Disposition:   Disposition: Discharged  Condition: Stable         Errol Terry MD  06/17/22 1115

## 2022-06-30 ENCOUNTER — OFFICE VISIT (OUTPATIENT)
Dept: UROLOGY | Facility: CLINIC | Age: 62
End: 2022-06-30
Payer: COMMERCIAL

## 2022-06-30 ENCOUNTER — HOSPITAL ENCOUNTER (OUTPATIENT)
Dept: RADIOLOGY | Facility: HOSPITAL | Age: 62
Discharge: HOME | End: 2022-06-30
Attending: NURSE PRACTITIONER
Payer: COMMERCIAL

## 2022-06-30 VITALS
WEIGHT: 264.56 LBS | BODY MASS INDEX: 37.96 KG/M2 | DIASTOLIC BLOOD PRESSURE: 85 MMHG | HEART RATE: 64 BPM | SYSTOLIC BLOOD PRESSURE: 137 MMHG

## 2022-06-30 DIAGNOSIS — N20.0 RENAL STONES: Primary | ICD-10-CM

## 2022-06-30 DIAGNOSIS — N20.0 RENAL STONES: ICD-10-CM

## 2022-06-30 DIAGNOSIS — N20.0 NEPHROLITHIASIS: ICD-10-CM

## 2022-06-30 DIAGNOSIS — R10.9 RIGHT FLANK PAIN: ICD-10-CM

## 2022-06-30 PROCEDURE — 99204 OFFICE O/P NEW MOD 45 MIN: CPT | Mod: S$GLB,,, | Performed by: NURSE PRACTITIONER

## 2022-06-30 PROCEDURE — 99204 PR OFFICE/OUTPT VISIT, NEW, LEVL IV, 45-59 MIN: ICD-10-PCS | Mod: S$GLB,,, | Performed by: NURSE PRACTITIONER

## 2022-06-30 PROCEDURE — 76770 US EXAM ABDO BACK WALL COMP: CPT | Mod: TC

## 2022-06-30 PROCEDURE — 76770 US EXAM ABDO BACK WALL COMP: CPT | Mod: 26,,, | Performed by: RADIOLOGY

## 2022-06-30 PROCEDURE — 99999 PR PBB SHADOW E&M-EST. PATIENT-LVL III: ICD-10-PCS | Mod: PBBFAC,,, | Performed by: NURSE PRACTITIONER

## 2022-06-30 PROCEDURE — 99999 PR PBB SHADOW E&M-EST. PATIENT-LVL III: CPT | Mod: PBBFAC,,, | Performed by: NURSE PRACTITIONER

## 2022-06-30 PROCEDURE — 76770 US RETROPERITONEAL COMPLETE: ICD-10-PCS | Mod: 26,,, | Performed by: RADIOLOGY

## 2022-06-30 NOTE — PROGRESS NOTES
Chief Complaint:   Renal stones    HPI:  Patient is a 61-year-old male that is presenting as a ED follow-up.  Patient was sent to ED secondary to left flank pain.  CT scan indicated 3 mm stone at the right UVJ and a 3 mm stone in the bladder.  There is mild amount of right hydronephrosis.  5 mm stone in the right kidney.  Patient has no history of renal stones.  Patient is completely asymptomatic today urine in clinic was negative.  PVR was 20 mL.  No abd/pelvic pain and no exac/rel factors.  No hematuria.      Allergies:  Patient has no known allergies.    Medications:  has a current medication list which includes the following prescription(s): metoprolol succinate, rosuvastatin, sertraline, tamsulosin, and promethazine.    Review of Systems:  General: No fever, chills, fatigability, or weight loss.  Skin: No rashes, itching, or changes in color or texture of skin.  Chest: Denies KENNEY, cyanosis, wheezing, cough, and sputum production.  Abdomen: Appetite fine. No weight loss. Denies diarrhea, abdominal pain, hematemesis, or blood in stool.  Musculoskeletal: No joint stiffness or swelling. Denies back pain.  : As above.  All other review of systems negative.    PMH:   has a past medical history of Anxiety disorder, unspecified, CAD in native artery (10/05/2020), Hypercholesterolemia (10/05/2020), Hypertension, Obesity (BMI 35.0-39.9 without comorbidity) (08/10/2020), and SVT (supraventricular tachycardia) (05/04/2021).    PSH:   has a past surgical history that includes None.    FamHx: family history includes Heart disease (age of onset: 23) in his sister; Heart disease (age of onset: 45) in his brother; No Known Problems in his sister.    SocHx:  reports that he has never smoked. He has never used smokeless tobacco. He reports that he does not drink alcohol and does not use drugs.      Physical Exam:  Vitals:    06/30/22 1358   BP: 137/85   Pulse: 64     General: A&Ox3, no apparent distress, no deformities  Neck:  No masses, normal thyroid  Lungs: normal inspiration, no use of accessory muscles  Heart: normal pulse, no arrhythmias  Abdomen: Soft, NT, ND, no masses, no hernias, no hepatosplenomegaly  Lymphatic: Neck and groin nodes negative    Labs/Studies:   See HPI    Impression/Plan:   Renal stones  Patient is currently asymptomatic.  Will send for stat renal ultrasound I will call him with results and needed follow-up.

## 2022-07-01 DIAGNOSIS — N28.1 RENAL CYST: Primary | ICD-10-CM

## 2022-07-01 NOTE — PROGRESS NOTES
Spoke to  patient and informed him that renal ultrasound indicates all renal stones have passed, except for a nonobstructing right renal stone. Also informed him that he  has multiple simple, noncancerous cysts.  Told him that we would followup with US in one year; pt appreciated the call and appt scheduled.

## 2022-07-05 ENCOUNTER — LAB VISIT (OUTPATIENT)
Dept: LAB | Facility: HOSPITAL | Age: 62
End: 2022-07-05
Attending: INTERNAL MEDICINE
Payer: COMMERCIAL

## 2022-07-05 DIAGNOSIS — E78.00 HYPERCHOLESTEROLEMIA: ICD-10-CM

## 2022-07-05 LAB
ALBUMIN SERPL BCP-MCNC: 3.7 G/DL (ref 3.5–5.2)
ALP SERPL-CCNC: 57 U/L (ref 55–135)
ALT SERPL W/O P-5'-P-CCNC: 28 U/L (ref 10–44)
ANION GAP SERPL CALC-SCNC: 5 MMOL/L (ref 8–16)
AST SERPL-CCNC: 18 U/L (ref 10–40)
BILIRUB SERPL-MCNC: 0.4 MG/DL (ref 0.1–1)
BUN SERPL-MCNC: 15 MG/DL (ref 8–23)
CALCIUM SERPL-MCNC: 8.8 MG/DL (ref 8.7–10.5)
CHLORIDE SERPL-SCNC: 107 MMOL/L (ref 95–110)
CHOLEST SERPL-MCNC: 126 MG/DL (ref 120–199)
CHOLEST/HDLC SERPL: 3 {RATIO} (ref 2–5)
CO2 SERPL-SCNC: 27 MMOL/L (ref 23–29)
CREAT SERPL-MCNC: 1 MG/DL (ref 0.5–1.4)
EST. GFR  (AFRICAN AMERICAN): >60 ML/MIN/1.73 M^2
EST. GFR  (NON AFRICAN AMERICAN): >60 ML/MIN/1.73 M^2
GLUCOSE SERPL-MCNC: 109 MG/DL (ref 70–110)
HDLC SERPL-MCNC: 42 MG/DL (ref 40–75)
HDLC SERPL: 33.3 % (ref 20–50)
LDLC SERPL CALC-MCNC: 72 MG/DL (ref 63–159)
NONHDLC SERPL-MCNC: 84 MG/DL
POTASSIUM SERPL-SCNC: 4.6 MMOL/L (ref 3.5–5.1)
PROT SERPL-MCNC: 6.6 G/DL (ref 6–8.4)
SODIUM SERPL-SCNC: 139 MMOL/L (ref 136–145)
TRIGL SERPL-MCNC: 60 MG/DL (ref 30–150)

## 2022-07-05 PROCEDURE — 36415 COLL VENOUS BLD VENIPUNCTURE: CPT | Performed by: INTERNAL MEDICINE

## 2022-07-05 PROCEDURE — 80061 LIPID PANEL: CPT | Performed by: INTERNAL MEDICINE

## 2022-07-05 PROCEDURE — 80053 COMPREHEN METABOLIC PANEL: CPT | Performed by: INTERNAL MEDICINE

## 2022-07-06 ENCOUNTER — TELEPHONE (OUTPATIENT)
Dept: CARDIOLOGY | Facility: HOSPITAL | Age: 62
End: 2022-07-06
Payer: COMMERCIAL

## 2022-07-07 ENCOUNTER — TELEPHONE (OUTPATIENT)
Dept: CARDIOLOGY | Facility: CLINIC | Age: 62
End: 2022-07-07
Payer: COMMERCIAL

## 2022-07-07 NOTE — TELEPHONE ENCOUNTER
Please call pt.  Lipids are excellent.  Continue current dose of statin.     Dr Grajeda      Patient advised of results verbalized understanding

## 2022-07-07 NOTE — TELEPHONE ENCOUNTER
Called patient to advise per Dr. Grajeda      Please call pt.  Lipids are excellent.  Continue current dose of statin.     Dr Grajeda    lvm to return call

## 2022-08-04 ENCOUNTER — LAB VISIT (OUTPATIENT)
Dept: LAB | Facility: HOSPITAL | Age: 62
End: 2022-08-04
Attending: FAMILY MEDICINE
Payer: COMMERCIAL

## 2022-08-04 ENCOUNTER — OFFICE VISIT (OUTPATIENT)
Dept: FAMILY MEDICINE | Facility: CLINIC | Age: 62
End: 2022-08-04
Payer: COMMERCIAL

## 2022-08-04 VITALS
WEIGHT: 264.31 LBS | TEMPERATURE: 99 F | RESPIRATION RATE: 28 BRPM | HEIGHT: 70 IN | HEART RATE: 79 BPM | BODY MASS INDEX: 37.84 KG/M2 | DIASTOLIC BLOOD PRESSURE: 64 MMHG | OXYGEN SATURATION: 97 % | SYSTOLIC BLOOD PRESSURE: 116 MMHG

## 2022-08-04 DIAGNOSIS — Z00.00 ANNUAL PHYSICAL EXAM: ICD-10-CM

## 2022-08-04 DIAGNOSIS — F41.8 SITUATIONAL ANXIETY: ICD-10-CM

## 2022-08-04 DIAGNOSIS — E78.00 HYPERCHOLESTEROLEMIA: ICD-10-CM

## 2022-08-04 DIAGNOSIS — Z12.11 COLON CANCER SCREENING: ICD-10-CM

## 2022-08-04 DIAGNOSIS — I10 ESSENTIAL HYPERTENSION: ICD-10-CM

## 2022-08-04 DIAGNOSIS — E66.01 SEVERE OBESITY (BMI 35.0-39.9) WITH COMORBIDITY: ICD-10-CM

## 2022-08-04 DIAGNOSIS — I47.10 SVT (SUPRAVENTRICULAR TACHYCARDIA): ICD-10-CM

## 2022-08-04 DIAGNOSIS — Z00.00 ANNUAL PHYSICAL EXAM: Primary | ICD-10-CM

## 2022-08-04 DIAGNOSIS — I25.10 CAD IN NATIVE ARTERY: ICD-10-CM

## 2022-08-04 LAB
COMPLEXED PSA SERPL-MCNC: 1.5 NG/ML (ref 0–4)
TSH SERPL DL<=0.005 MIU/L-ACNC: 1.16 UIU/ML (ref 0.4–4)

## 2022-08-04 PROCEDURE — 99396 PREV VISIT EST AGE 40-64: CPT | Mod: S$GLB,,, | Performed by: FAMILY MEDICINE

## 2022-08-04 PROCEDURE — 99999 PR PBB SHADOW E&M-EST. PATIENT-LVL IV: ICD-10-PCS | Mod: PBBFAC,,, | Performed by: FAMILY MEDICINE

## 2022-08-04 PROCEDURE — 99396 PR PREVENTIVE VISIT,EST,40-64: ICD-10-PCS | Mod: S$GLB,,, | Performed by: FAMILY MEDICINE

## 2022-08-04 PROCEDURE — 36415 COLL VENOUS BLD VENIPUNCTURE: CPT | Mod: PO | Performed by: FAMILY MEDICINE

## 2022-08-04 PROCEDURE — 84153 ASSAY OF PSA TOTAL: CPT | Performed by: FAMILY MEDICINE

## 2022-08-04 PROCEDURE — 99999 PR PBB SHADOW E&M-EST. PATIENT-LVL IV: CPT | Mod: PBBFAC,,, | Performed by: FAMILY MEDICINE

## 2022-08-04 PROCEDURE — 84443 ASSAY THYROID STIM HORMONE: CPT | Performed by: FAMILY MEDICINE

## 2022-08-04 NOTE — PROGRESS NOTES
HISTORY OF PRESENT ILLNESS: Mr. Hickey comes in today for annual wellness examination.  He is not fasting and has not taken medications today.     END OF LIFE DECISION:He does not have a living will. He does desire life support.    Current Outpatient Medications   Medication Sig    metoprolol succinate (TOPROL-XL) 50 MG 24 hr tablet Take 1 tablet (50 mg total) by mouth once daily.    promethazine (PHENERGAN) 25 MG tablet Take 1 tablet (25 mg total) by mouth every 6 (six) hours as needed for Nausea.    rosuvastatin (CRESTOR) 5 MG tablet TAKE 1 TABLET BY MOUTH EVERY DAY IN THE EVENING    sertraline (ZOLOFT) 50 MG tablet Take 1 tablet (50 mg total) by mouth once daily.      SCREENINGS:    Cholesterol w/CMPl: July 5, 2022.  Colonoscopy: Never. He desires.  Prostate Exam/PSA: September 8, 2020 - 0.93.   Eye Exam: September 2020 per patient. He glasses.  PPD: Negative the past.     Immunizations:    Shingrix: Never.  Reports no history of varicella or zoster.  Tdap: December 10, 2019.  Flu shot: December 10, 2019.  Covid-19 (Pfizer) vaccine series - March 21, 2021, April 11, 2021, January 29, 2022.        ROS:  GENERAL: No fever, chills, fatigue or unusual weight change. Appetite normal. Exercises not leisurely but moves around daily at job. Monitors diet does. Weight 118.8 kg (261 lb 14.5 oz) at May 26, 2022 visit. Desires see nutritionist.  SKIN: No rashes, itching, changes in mole, color or texture of skin or easy bruising.  HEAD: No headaches or recent head trauma.  EYES: No change in vision,   no pain, diplopia, redness or discharge.  EARS: Denies ear pain, discharge, vertigo or decreased hearing.  NOSE: No epistaxis or rhinitis. Nontender external nose.  MOUTH & THROAT: No hoarseness or change in voice. No excessive gum bleeding or mouth sores.  No sore throat.  NODES: Denies swollen glands.  CHEST: Denies KENNEY, wheezing, cough, hemoptysis or sputum production.  CARDIOVASCULAR: Denies chest pain, palpitations. Saw  "Dr. Grajeda, cardiologist, on January 11, 2022 for CAD, SVT, HTN, HLD surveillance with 6-month follow up advised.  ABDOMEN: Denies diarrhea, constipation, vomiting, abdominal pain, or blood in stool.  GENITOURINARY: No flank pain, dysuria or hematuria.No nocturia or frequency. No lesions, pain or swelling in genital area. Performs monthly self testicular exam does. Saw NP Mary Rodriguez with urology on June 30, 2022 for right flank pain, nephrolithiasis with renal ultrasound performed and 1-year follow up with ultrasound advised and scheduled for July 3, 2023.  ENDOCRINE: Denies diabetes, thyroid or cholesterol problems. Performs home glucose checks: N./A  HEME/LYMPH: Denies bleeding problems.  PERIPHERAL VASCULAR: No claudication or cyanosis  MUSCULOSKELETAL: No joint pain or swelling. No edema. But reports occasional knee(s) stiffness with pain.  NEUROLOGIC: No history of seizures, tremors, alteration of gait or coordination.  PSYCHIATRIC: Denies mood swings, depression, anxiety, homicidal or suicidal thoughts. Denies sleep problems.     PE:   VS:  Blood Pressure 116/64   Pulse 79   Temperature 99.1 °F (37.3 °C)   Respiration (Abnormal) 28   Height 5' 10" (1.778 m)   Weight 119.9 kg (264 lb 5.3 oz)   Oxygen Saturation 97%   Body Mass Index 37.93 kg/m²   APPEARANCE:  Well nourished, well developed male, pleasant and obese, alert and oriented in no acute distress.    HEAD: Non tender . Full range of motion.  EYES: PERRL, conjunctiva pink, lids no edema.  EARS: External canal patent, no swelling or redness. TM's shiny and clear.  NOSE: Mucosa and turbinates pink, not swollen. No discharge  THROAT: No pharyngeal erythema or exudate. No stridor.    NECK: Supple, no mass, thyroid not enlarged. No carotid bruit.  NODES: No cervical, axillary inguinal lymph node enlargement.  CHEST: Normal respiratory effort. Lungs clear to auscultation.  CARDIOVASCULAR: Normal S1, S2. No rubs, murmurs or gallops.No edema.Pedal pulses " palpable bilaterally.  ABDOMEN: Bowel sounds present. Not distended. Soft. No tenderness, masses or organomegaly.  BREAST: Not examined per patient request.  GENITALIA: Not examined per patient request.  RECTAL: Not examined per patient request.  MUSCULOSKELETAL: No joint deformities or stiffness. He is ambulatory without problems.  SKIN: No rashes or suspicious lesions, normal color and turgor.  NEUROLOGIC: Cranial Nerves: II-XII grossly intact.  DTR's: Knees, Ankles 2+ and equal bilaterally Gait & Posture: Normal gait and fine motion.  PSYCHIATRIC: Patient alert, oriented x 3. Mood/Affect normal without acute anxiety and depression noted.Judgement and insight-good as he makes appropriate decisions on today's examination.    ASSESSMENT:    ICD-10-CM ICD-9-CM    1. Annual physical exam  Z00.00 V70.0 TSH      PSA, Screening      2. Essential hypertension  I10 401.9       3. Hypercholesterolemia  E78.00 272.0       4. SVT (supraventricular tachycardia)  I47.1 427.89       5. CAD in native artery  I25.10 414.01       6. Situational anxiety  F41.8 300.09       7. Severe obesity (BMI 35.0-39.9) with comorbidity  E66.01 278.01       8. Colon cancer screening  Z12.11 V76.51 Ambulatory referral/consult to Endo Procedure           PLAN:   1. Age-appropriate counseling-appropriate low-sodium, low-cholesterol, low carbohydrate diet and exercise daily, monthly self testicular examination, annual wellness examination.  2. Patient advised to call for results.  3. Continue current medications.  4. Keep follow up with specialists.  5. Flu shot this fall.  6. Follow up in about 1 year (around 8/4/2023) for physical.            Answers for HPI/ROS submitted by the patient on 8/1/2022  activity change: No  unexpected weight change: No  neck pain: No  hearing loss: No  rhinorrhea: No  trouble swallowing: No  eye discharge: No  visual disturbance: No  chest tightness: No  wheezing: No  chest pain: No  palpitations: No  blood in  stool: No  constipation: No  vomiting: No  diarrhea: No  polydipsia: No  polyuria: No  difficulty urinating: No  urgency: No  hematuria: No  joint swelling: No  arthralgias: No  headaches: No  weakness: No  confusion: No  dysphoric mood: No

## 2022-08-05 ENCOUNTER — HOSPITAL ENCOUNTER (OUTPATIENT)
Dept: PREADMISSION TESTING | Facility: HOSPITAL | Age: 62
Discharge: HOME | End: 2022-08-05
Attending: FAMILY MEDICINE
Payer: COMMERCIAL

## 2022-08-05 DIAGNOSIS — Z12.11 COLON CANCER SCREENING: ICD-10-CM

## 2023-02-13 ENCOUNTER — OFFICE VISIT (OUTPATIENT)
Dept: FAMILY MEDICINE | Facility: CLINIC | Age: 63
End: 2023-02-13
Payer: COMMERCIAL

## 2023-02-13 VITALS
BODY MASS INDEX: 37.97 KG/M2 | OXYGEN SATURATION: 97 % | SYSTOLIC BLOOD PRESSURE: 126 MMHG | TEMPERATURE: 99 F | WEIGHT: 265.19 LBS | HEIGHT: 70 IN | HEART RATE: 79 BPM | DIASTOLIC BLOOD PRESSURE: 72 MMHG

## 2023-02-13 DIAGNOSIS — J01.90 ACUTE NON-RECURRENT SINUSITIS, UNSPECIFIED LOCATION: ICD-10-CM

## 2023-02-13 DIAGNOSIS — I10 ESSENTIAL HYPERTENSION: ICD-10-CM

## 2023-02-13 DIAGNOSIS — J30.2 SEASONAL ALLERGIC RHINITIS, UNSPECIFIED TRIGGER: ICD-10-CM

## 2023-02-13 DIAGNOSIS — E66.01 SEVERE OBESITY (BMI 35.0-39.9) WITH COMORBIDITY: ICD-10-CM

## 2023-02-13 PROCEDURE — 99999 PR PBB SHADOW E&M-EST. PATIENT-LVL IV: CPT | Mod: PBBFAC,,, | Performed by: FAMILY MEDICINE

## 2023-02-13 PROCEDURE — 99213 PR OFFICE/OUTPT VISIT, EST, LEVL III, 20-29 MIN: ICD-10-PCS | Mod: S$GLB,,, | Performed by: FAMILY MEDICINE

## 2023-02-13 PROCEDURE — 99213 OFFICE O/P EST LOW 20 MIN: CPT | Mod: S$GLB,,, | Performed by: FAMILY MEDICINE

## 2023-02-13 PROCEDURE — 99999 PR PBB SHADOW E&M-EST. PATIENT-LVL IV: ICD-10-PCS | Mod: PBBFAC,,, | Performed by: FAMILY MEDICINE

## 2023-02-13 RX ORDER — MONTELUKAST SODIUM 10 MG/1
10 TABLET ORAL NIGHTLY
Qty: 90 TABLET | Refills: 1 | Status: SHIPPED | OUTPATIENT
Start: 2023-02-13 | End: 2023-03-15

## 2023-02-13 RX ORDER — FLUTICASONE PROPIONATE 50 MCG
2 SPRAY, SUSPENSION (ML) NASAL DAILY
Qty: 48 G | Refills: 1 | Status: SHIPPED | OUTPATIENT
Start: 2023-02-13 | End: 2023-11-29

## 2023-02-13 RX ORDER — AMOXICILLIN 875 MG/1
875 TABLET, FILM COATED ORAL 2 TIMES DAILY
Qty: 20 TABLET | Refills: 0 | Status: SHIPPED | OUTPATIENT
Start: 2023-02-13 | End: 2023-02-23

## 2023-02-13 NOTE — PROGRESS NOTES
Rob Hickey .    Chief Complaint   Patient presents with    Allergies/sinus symptoms       History of Present Illness:   Mr. Hickey comes in today with complaint of having intermittent symptoms-nasal congestion, postnasal drip, runny nose, occasional sore throat (although not now) for 1 month.  He thinks he may have had fever last week.  He states he took Sudafed only wants 2-3 days ago and with help.  He works for [employer] and states fan is run in the building which he works in and states this affects his sinuses.  He states he went to work earlier today.  He denies having known COVID exposure and has not had recent COVID testing.  He states he did not receive flu shot this fall but states he has received COVID vaccine series plus booster.  He states he does not smoke cigarettes.    He denies having fever, chills, fatigue, appetite changes; shortness of breath, cough, wheezing; chest pain, palpitations, leg swelling; other sinus symptoms; abdominal pain, nausea, vomiting, diarrhea, constipation; unusual urinary symptoms; polydipsia, polyphagia, polyuria, hot or cold intolerance; back pain; headache; anxiety, depression, homicidal or suicidal thoughts.             Current Outpatient Medications   Medication Sig    metoprolol succinate (TOPROL-XL) 50 MG 24 hr tablet Take 1 tablet (50 mg total) by mouth once daily.    rosuvastatin (CRESTOR) 5 MG tablet TAKE 1 TABLET BY MOUTH EVERY DAY IN THE EVENING    sertraline (ZOLOFT) 50 MG tablet Take 1 tablet (50 mg total) by mouth once daily.       Review of Systems   Constitutional:  Negative for appetite change, chills, fatigue and fever.   HENT:  Positive for congestion, postnasal drip, rhinorrhea and sore throat. Negative for sinus pressure, sinus pain and sneezing.    Eyes:  Negative for pain, discharge, redness and itching.   Respiratory:  Negative for cough, shortness of breath and wheezing.    Cardiovascular:  Negative for chest pain, palpitations and leg swelling.    Gastrointestinal:  Negative for abdominal pain, constipation, diarrhea, nausea and vomiting.   Endocrine: Negative for cold intolerance, heat intolerance, polydipsia, polyphagia and polyuria.   Genitourinary:  Negative for difficulty urinating.   Musculoskeletal:  Negative for back pain and myalgias.   Neurological:  Negative for numbness and headaches.   Psychiatric/Behavioral:  Negative for dysphoric mood and suicidal ideas. The patient is not nervous/anxious.         Negative for homicidal ideas.     Objective:  Physical Exam  Vitals reviewed.   Constitutional:       General: He is not in acute distress.     Appearance: Normal appearance. He is well-developed. He is obese. He is not ill-appearing, toxic-appearing or diaphoretic.      Comments: Pleasant.   HENT:      Head: Normocephalic and atraumatic.      Comments: Non tender sinuses.     Right Ear: Tympanic membrane, ear canal and external ear normal. There is no impacted cerumen.      Left Ear: Tympanic membrane, ear canal and external ear normal. There is no impacted cerumen.      Nose: Congestion present. No rhinorrhea.      Mouth/Throat:      Mouth: Mucous membranes are moist.      Pharynx: Oropharynx is clear. No oropharyngeal exudate or posterior oropharyngeal erythema.   Eyes:      General:         Right eye: No discharge.         Left eye: No discharge.      Extraocular Movements: Extraocular movements intact.      Conjunctiva/sclera: Conjunctivae normal.      Pupils: Pupils are equal, round, and reactive to light.      Comments: He wears glasses.   Cardiovascular:      Rate and Rhythm: Normal rate and regular rhythm.      Pulses: Normal pulses.      Heart sounds: Normal heart sounds. No murmur heard.  Pulmonary:      Effort: Pulmonary effort is normal. No respiratory distress.      Breath sounds: Normal breath sounds. No wheezing.   Abdominal:      General: Bowel sounds are normal. There is no distension.      Palpations: Abdomen is soft. There is no  mass.      Tenderness: There is no abdominal tenderness. There is no guarding or rebound.   Musculoskeletal:         General: No swelling or tenderness. Normal range of motion.      Cervical back: Normal range of motion and neck supple. No tenderness.      Comments: He is ambulatory without problems.   Lymphadenopathy:      Cervical: No cervical adenopathy.   Neurological:      General: No focal deficit present.      Mental Status: He is alert and oriented to person, place, and time.   Psychiatric:         Mood and Affect: Mood normal.         Behavior: Behavior normal.         Thought Content: Thought content normal.         Judgment: Judgment normal.       ASSESSMENT:  1. Acute non-recurrent sinusitis, unspecified location    2. Seasonal allergic rhinitis, unspecified trigger    3. Essential hypertension    4. Severe obesity (BMI 35.0-39.9) with comorbidity        PLAN:  Rob was seen today for allergies/sinus symptoms.    Diagnoses and all orders for this visit:    Acute non-recurrent sinusitis, unspecified location  -     amoxicillin (AMOXIL) 875 MG tablet; Take 1 tablet (875 mg total) by mouth 2 (two) times daily. for 10 days    Seasonal allergic rhinitis, unspecified trigger  -     fluticasone propionate (FLONASE) 50 mcg/actuation nasal spray; 2 sprays (100 mcg total) by Each Nostril route once daily.  -     montelukast (SINGULAIR) 10 mg tablet; Take 1 tablet (10 mg total) by mouth every evening.    Essential hypertension    Severe obesity (BMI 35.0-39.9) with comorbidity      Avoid use of OTC sinus medications (including Sudafed) as may cause elevation of blood pressure.  Add Amoxicillin 875 mg twice daily x 10 days; medications precautions discussed with patient.  Add Singulair 10 mg daily along with Flonase 2 sprays each nostril daily; medications precautions discussed with patient.  Continue current medications, follow low sodium, low cholesterol, low carb diet, daily walks.  Keep follow up with  specialists.  Follow up if symptoms worsen or fail to improve.

## 2023-03-02 ENCOUNTER — TELEPHONE (OUTPATIENT)
Dept: FAMILY MEDICINE | Facility: CLINIC | Age: 63
End: 2023-03-02

## 2023-03-03 ENCOUNTER — OFFICE VISIT (OUTPATIENT)
Dept: FAMILY MEDICINE | Facility: CLINIC | Age: 63
End: 2023-03-03
Payer: COMMERCIAL

## 2023-03-03 VITALS
BODY MASS INDEX: 37.78 KG/M2 | HEART RATE: 88 BPM | SYSTOLIC BLOOD PRESSURE: 130 MMHG | HEIGHT: 70 IN | TEMPERATURE: 98 F | WEIGHT: 263.88 LBS | RESPIRATION RATE: 18 BRPM | OXYGEN SATURATION: 98 % | DIASTOLIC BLOOD PRESSURE: 82 MMHG

## 2023-03-03 DIAGNOSIS — E66.01 SEVERE OBESITY (BMI 35.0-39.9) WITH COMORBIDITY: ICD-10-CM

## 2023-03-03 DIAGNOSIS — J30.2 SEASONAL ALLERGIC RHINITIS, UNSPECIFIED TRIGGER: Primary | ICD-10-CM

## 2023-03-03 PROCEDURE — 99214 PR OFFICE/OUTPT VISIT, EST, LEVL IV, 30-39 MIN: ICD-10-PCS | Mod: S$GLB,,, | Performed by: FAMILY MEDICINE

## 2023-03-03 PROCEDURE — 99214 OFFICE O/P EST MOD 30 MIN: CPT | Mod: S$GLB,,, | Performed by: FAMILY MEDICINE

## 2023-03-03 PROCEDURE — 99999 PR PBB SHADOW E&M-EST. PATIENT-LVL V: ICD-10-PCS | Mod: PBBFAC,,, | Performed by: FAMILY MEDICINE

## 2023-03-03 PROCEDURE — 99999 PR PBB SHADOW E&M-EST. PATIENT-LVL V: CPT | Mod: PBBFAC,,, | Performed by: FAMILY MEDICINE

## 2023-03-03 NOTE — PROGRESS NOTES
Rob Hickey .    Chief Complaint   Patient presents with    Allergic Rhinitis        History of Present Illness:   Mr. Hickey comes in today with complaint of having sinus symptoms-nasal congestion with difficulty breathing and wheezing from his nose.  He states he takes Singulair daily and uses Flonase with help although he states he still feels funny (not normal).  He works for [employer] at the  and in the back.  He states he does not wear mask at work.  He states the fan constantly runs at work.  He does not smoke.  He reports no history of COVID.  He states he received COVID vaccine series plus booster.  He states he missed work on February 28, 2023 due to his symptoms. He states he sometimes misses work 3-4 days for 2 to 3 times a month due to his sinuses.  He requests FMLA form be completed.    He denies having fever, chills, fatigue, appetite changes; shortness of breath, cough; other sinu symptoms; chest pain, palpitations, leg swelling; abdominal pain, nausea, vomiting, diarrhea, constipation; unusual urinary symptoms; polydipsia, polyphagia, polyuria, hot or cold intolerance; back pain; headache; anxiety, depression, homicidal or suicidal thoughts.             Current Outpatient Medications   Medication Sig    fluticasone propionate (FLONASE) 50 mcg/actuation nasal spray 2 sprays (100 mcg total) by Each Nostril route once daily.    metoprolol succinate (TOPROL-XL) 50 MG 24 hr tablet Take 1 tablet (50 mg total) by mouth once daily.    montelukast (SINGULAIR) 10 mg tablet Take 1 tablet (10 mg total) by mouth every evening.    rosuvastatin (CRESTOR) 5 MG tablet TAKE 1 TABLET BY MOUTH EVERY DAY IN THE EVENING    sertraline (ZOLOFT) 50 MG tablet Take 1 tablet (50 mg total) by mouth once daily.       Review of Systems   Constitutional:  Negative for activity change, appetite change, chills, fatigue and fever.   HENT:  Positive for congestion. Negative for postnasal drip, rhinorrhea, sinus pressure,  sinus pain, sneezing and sore throat.    Respiratory:  Positive for wheezing. Negative for cough and shortness of breath.    Cardiovascular:  Negative for chest pain, palpitations and leg swelling.   Gastrointestinal:  Negative for abdominal pain, constipation, diarrhea, nausea and vomiting.   Endocrine: Negative for cold intolerance, heat intolerance, polydipsia, polyphagia and polyuria.   Genitourinary:  Negative for difficulty urinating.   Musculoskeletal:  Negative for back pain.   Neurological:  Negative for numbness and headaches.   Psychiatric/Behavioral:  Negative for dysphoric mood and suicidal ideas. The patient is not nervous/anxious.         Negative for homicidal ideas.     Objective:  Physical Exam  Vitals reviewed.   Constitutional:       General: He is not in acute distress.     Appearance: Normal appearance. He is well-developed. He is obese. He is not ill-appearing, toxic-appearing or diaphoretic.      Comments: Pleasant.   HENT:      Head: Normocephalic and atraumatic.      Comments: Non tender sinuses.     Right Ear: Tympanic membrane, ear canal and external ear normal. There is no impacted cerumen.      Left Ear: Tympanic membrane, ear canal and external ear normal. There is no impacted cerumen.      Nose: Congestion present. No rhinorrhea.      Mouth/Throat:      Mouth: Mucous membranes are moist.      Pharynx: Oropharynx is clear. No oropharyngeal exudate or posterior oropharyngeal erythema.   Eyes:      General:         Right eye: No discharge.         Left eye: No discharge.      Extraocular Movements: Extraocular movements intact.      Conjunctiva/sclera: Conjunctivae normal.      Pupils: Pupils are equal, round, and reactive to light.      Comments: He wears glasses.   Cardiovascular:      Rate and Rhythm: Normal rate and regular rhythm.      Pulses: Normal pulses.      Heart sounds: Normal heart sounds. No murmur heard.  Pulmonary:      Effort: Pulmonary effort is normal. No respiratory  distress.      Breath sounds: Normal breath sounds. No wheezing.   Abdominal:      General: Bowel sounds are normal. There is no distension.      Palpations: Abdomen is soft. There is no mass.      Tenderness: There is no abdominal tenderness. There is no guarding or rebound.   Musculoskeletal:         General: No swelling or tenderness. Normal range of motion.      Cervical back: Normal range of motion and neck supple. No tenderness.      Comments: He is ambulatory without problems.   Lymphadenopathy:      Cervical: No cervical adenopathy.   Neurological:      General: No focal deficit present.      Mental Status: He is alert and oriented to person, place, and time.   Psychiatric:         Mood and Affect: Mood normal.         Behavior: Behavior normal.         Thought Content: Thought content normal.         Judgment: Judgment normal.       ASSESSMENT:  1. Seasonal allergic rhinitis, unspecified trigger    2. Severe obesity (BMI 35.0-39.9) with comorbidity        PLAN:  Rob was seen today for allergic rhinitis .    Diagnoses and all orders for this visit:    Seasonal allergic rhinitis, unspecified trigger  -     Ambulatory referral/consult to ENT; Future    Severe obesity (BMI 35.0-39.9) with comorbidity    No labs today.  Continue current medications, follow low sodium, low cholesterol, low carb diet, daily walks.  Keep follow up with specialists.  FMLA form completed with original given to patient and copy noted on chartd.  Follow up if symptoms worsen or fail to improve. Keep 8/4/2023 scheduled physical with me.    30 minutes of total time spent on the encounter, which includes face to face time and non-face to face time preparing to see the patient (eg, review of tests), Obtaining and/or reviewing separately obtained history, Documenting clinical information in the electronic or other health record, Independently interpreting results (not separately reported) and communicating results to the  patient/family/caregiver, or Care coordination (not separately reported).      This note is  generated with speech recognition software and is subject to transcription error and sound alike phrases that may be missed by proofreading.

## 2023-03-08 ENCOUNTER — OFFICE VISIT (OUTPATIENT)
Dept: OTOLARYNGOLOGY | Facility: CLINIC | Age: 63
End: 2023-03-08
Payer: COMMERCIAL

## 2023-03-08 VITALS — TEMPERATURE: 98 F | HEART RATE: 73 BPM | DIASTOLIC BLOOD PRESSURE: 83 MMHG | SYSTOLIC BLOOD PRESSURE: 141 MMHG

## 2023-03-08 DIAGNOSIS — J30.2 SEASONAL ALLERGIC RHINITIS, UNSPECIFIED TRIGGER: ICD-10-CM

## 2023-03-08 DIAGNOSIS — J34.3 HYPERTROPHY OF INFERIOR NASAL TURBINATE: Primary | ICD-10-CM

## 2023-03-08 PROCEDURE — 99203 PR OFFICE/OUTPT VISIT, NEW, LEVL III, 30-44 MIN: ICD-10-PCS | Mod: S$GLB,,, | Performed by: OTOLARYNGOLOGY

## 2023-03-08 PROCEDURE — 99999 PR PBB SHADOW E&M-EST. PATIENT-LVL III: ICD-10-PCS | Mod: PBBFAC,,, | Performed by: OTOLARYNGOLOGY

## 2023-03-08 PROCEDURE — 99203 OFFICE O/P NEW LOW 30 MIN: CPT | Mod: S$GLB,,, | Performed by: OTOLARYNGOLOGY

## 2023-03-08 PROCEDURE — 99999 PR PBB SHADOW E&M-EST. PATIENT-LVL III: CPT | Mod: PBBFAC,,, | Performed by: OTOLARYNGOLOGY

## 2023-03-08 NOTE — PROGRESS NOTES
Referring Provider:    Mary Bruner Md  [address]  [place] [id number]  Subjective:   Patient: Rob Hickey Jr. [id number], :1960   Visit date:3/8/2023 10:44 AM    Chief Complaint:  Other (Seasonal allergies, runny nose, nasal congestion, sinus pressure Onset January )    HPI:    Prior notes reviewed by myself.  Clinical documentation obtained by nursing staff reviewed.     61 y/o gentleman here for evaluation of allergic rhinitis.  He reports frequent rhinorrhea, congestion, sneezing.  He was recently started on singulair and flonase by Dr. Bruner for these symptoms.  He has been on oral antibiotics for sinusitis at least 2-3 times over the past year.        Objective:     Physical Exam:  Vitals:  BP (!) 141/83   Pulse 73   Temp 97.8 °F (36.6 °C) (Temporal)   General appearance:  Well developed, well nourished    Ears:  Otoscopy of external auditory canals and tympanic membranes was normal, clinical speech reception thresholds grossly intact, no mass/lesion of auricle.    Nose:  No masses/lesions of external nose, congested nasal mucosa, septum, and turbinates were hypertrophied 3+.    Mouth:  No mass/lesion of lips, teeth, gums, hard/soft palate, tongue, tonsils, or oropharynx.    Neck & Lymphatics:  No cervical lymphadenopathy, no neck mass/crepitus/ asymmetry, trachea is midline, no thyroid enlargement/tenderness/mass.        [x]  Data Reviewed:    Lab Results   Component Value Date    WBC 15.19 (H) 2022    HGB 14.1 2022    HCT 42.4 2022    MCV 88 2022    EOSINOPHIL 0.1 2022               Assessment & Plan:   Hypertrophy of inferior nasal turbinate    Seasonal allergic rhinitis, unspecified trigger  -     Ambulatory referral/consult to ENT        He has intermittent symptoms of allergic rhinitis and he has had several sinus infections over the last 12 months.  Was recently started on Singulair and Flonase.  I recommended that he add an OTC antihistamine such  as Xyzal, Claritin, Zyrtec or Allegra to his regimen.  He is going to try this for at least the next 4-6 weeks and report back.  He understands that there are a multitude of other options for workup including allergy testing.

## 2023-03-13 PROBLEM — J30.2 SEASONAL ALLERGIC RHINITIS: Status: ACTIVE | Noted: 2023-03-13

## 2023-04-19 ENCOUNTER — PATIENT MESSAGE (OUTPATIENT)
Dept: ADMINISTRATIVE | Facility: HOSPITAL | Age: 63
End: 2023-04-19
Payer: COMMERCIAL

## 2023-06-07 ENCOUNTER — TELEPHONE (OUTPATIENT)
Dept: UROLOGY | Facility: CLINIC | Age: 63
End: 2023-06-07
Payer: COMMERCIAL

## 2023-06-28 ENCOUNTER — HOSPITAL ENCOUNTER (OUTPATIENT)
Dept: RADIOLOGY | Facility: HOSPITAL | Age: 63
Discharge: HOME | End: 2023-06-28
Attending: NURSE PRACTITIONER
Payer: COMMERCIAL

## 2023-06-28 ENCOUNTER — TELEPHONE (OUTPATIENT)
Dept: UROLOGY | Facility: CLINIC | Age: 63
End: 2023-06-28
Payer: COMMERCIAL

## 2023-06-28 DIAGNOSIS — N28.1 RENAL CYST: ICD-10-CM

## 2023-06-28 DIAGNOSIS — N28.89 OTHER SPECIFIED DISORDERS OF KIDNEY AND URETER: Primary | ICD-10-CM

## 2023-06-28 DIAGNOSIS — N32.89 MASS OF URINARY BLADDER DETERMINED BY ULTRASOUND: ICD-10-CM

## 2023-06-28 PROCEDURE — 76770 US EXAM ABDO BACK WALL COMP: CPT | Mod: 26,,, | Performed by: RADIOLOGY

## 2023-06-28 PROCEDURE — 76770 US RETROPERITONEAL COMPLETE: ICD-10-PCS | Mod: 26,,, | Performed by: RADIOLOGY

## 2023-06-28 PROCEDURE — 76770 US EXAM ABDO BACK WALL COMP: CPT | Mod: TC

## 2023-07-10 ENCOUNTER — LAB VISIT (OUTPATIENT)
Dept: LAB | Facility: HOSPITAL | Age: 63
End: 2023-07-10
Payer: COMMERCIAL

## 2023-07-10 DIAGNOSIS — N32.89 MASS OF URINARY BLADDER DETERMINED BY ULTRASOUND: ICD-10-CM

## 2023-07-10 LAB
CREAT SERPL-MCNC: 1 MG/DL (ref 0.5–1.4)
EST. GFR  (NO RACE VARIABLE): >60 ML/MIN/1.73 M^2

## 2023-07-10 PROCEDURE — 36415 COLL VENOUS BLD VENIPUNCTURE: CPT | Performed by: NURSE PRACTITIONER

## 2023-07-10 PROCEDURE — 82565 ASSAY OF CREATININE: CPT | Performed by: NURSE PRACTITIONER

## 2023-07-12 ENCOUNTER — HOSPITAL ENCOUNTER (OUTPATIENT)
Dept: RADIOLOGY | Facility: HOSPITAL | Age: 63
Discharge: HOME | End: 2023-07-12
Attending: NURSE PRACTITIONER
Payer: COMMERCIAL

## 2023-07-12 DIAGNOSIS — N28.89 OTHER SPECIFIED DISORDERS OF KIDNEY AND URETER: ICD-10-CM

## 2023-07-12 PROCEDURE — 74178 CT ABD&PLV WO CNTR FLWD CNTR: CPT | Mod: TC

## 2023-07-12 PROCEDURE — 25500020 PHARM REV CODE 255: Performed by: NURSE PRACTITIONER

## 2023-07-12 PROCEDURE — 74178 CT ABD&PLV WO CNTR FLWD CNTR: CPT | Mod: 26,,, | Performed by: RADIOLOGY

## 2023-07-12 PROCEDURE — 74178 CT UROGRAM ABD PELVIS W WO: ICD-10-PCS | Mod: 26,,, | Performed by: RADIOLOGY

## 2023-07-12 RX ADMIN — IOHEXOL 100 ML: 350 INJECTION, SOLUTION INTRAVENOUS at 03:07

## 2023-07-13 ENCOUNTER — TELEPHONE (OUTPATIENT)
Dept: UROLOGY | Facility: CLINIC | Age: 63
End: 2023-07-13
Payer: COMMERCIAL

## 2023-07-13 DIAGNOSIS — Z12.5 PROSTATE CANCER SCREENING: Primary | ICD-10-CM

## 2023-07-13 DIAGNOSIS — R93.89 ABNORMAL CT SCAN: ICD-10-CM

## 2023-07-13 NOTE — TELEPHONE ENCOUNTER
Contacted pt and informed him that Ms. Rodriguez ordered some labs and an MRI on him; pt will rtc to discuss results with MD; pt verbalized understanding.

## 2023-08-03 ENCOUNTER — HOSPITAL ENCOUNTER (OUTPATIENT)
Dept: RADIOLOGY | Facility: HOSPITAL | Age: 63
Discharge: HOME | End: 2023-08-03
Attending: NURSE PRACTITIONER
Payer: COMMERCIAL

## 2023-08-03 DIAGNOSIS — R93.89 ABNORMAL CT SCAN: ICD-10-CM

## 2023-08-03 PROCEDURE — 25500020 PHARM REV CODE 255: Performed by: NURSE PRACTITIONER

## 2023-08-03 PROCEDURE — 72197 MRI PELVIS W/O & W/DYE: CPT | Mod: TC

## 2023-08-03 PROCEDURE — 72197 MRI PROSTATE W W/O CONTRAST: ICD-10-PCS | Mod: 26,,, | Performed by: RADIOLOGY

## 2023-08-03 PROCEDURE — 72197 MRI PELVIS W/O & W/DYE: CPT | Mod: 26,,, | Performed by: RADIOLOGY

## 2023-08-03 PROCEDURE — A9585 GADOBUTROL INJECTION: HCPCS | Performed by: NURSE PRACTITIONER

## 2023-08-03 RX ORDER — GADOBUTROL 604.72 MG/ML
10 INJECTION INTRAVENOUS
Status: COMPLETED | OUTPATIENT
Start: 2023-08-03 | End: 2023-08-03

## 2023-08-03 RX ADMIN — GADOBUTROL 10 ML: 604.72 INJECTION INTRAVENOUS at 12:08

## 2023-08-04 ENCOUNTER — OFFICE VISIT (OUTPATIENT)
Dept: FAMILY MEDICINE | Facility: CLINIC | Age: 63
End: 2023-08-04
Payer: COMMERCIAL

## 2023-08-04 VITALS
OXYGEN SATURATION: 97 % | HEIGHT: 70 IN | BODY MASS INDEX: 37.71 KG/M2 | WEIGHT: 263.44 LBS | TEMPERATURE: 99 F | DIASTOLIC BLOOD PRESSURE: 86 MMHG | SYSTOLIC BLOOD PRESSURE: 126 MMHG | HEART RATE: 96 BPM

## 2023-08-04 DIAGNOSIS — I25.10 CAD IN NATIVE ARTERY: ICD-10-CM

## 2023-08-04 DIAGNOSIS — Z00.00 ANNUAL PHYSICAL EXAM: Primary | ICD-10-CM

## 2023-08-04 DIAGNOSIS — Z12.11 COLON CANCER SCREENING: ICD-10-CM

## 2023-08-04 DIAGNOSIS — E66.01 SEVERE OBESITY (BMI 35.0-39.9) WITH COMORBIDITY: ICD-10-CM

## 2023-08-04 DIAGNOSIS — I10 ESSENTIAL HYPERTENSION: ICD-10-CM

## 2023-08-04 DIAGNOSIS — E78.00 HYPERCHOLESTEROLEMIA: ICD-10-CM

## 2023-08-04 PROCEDURE — 99999 PR PBB SHADOW E&M-EST. PATIENT-LVL V: ICD-10-PCS | Mod: PBBFAC,,, | Performed by: FAMILY MEDICINE

## 2023-08-04 PROCEDURE — 99396 PREV VISIT EST AGE 40-64: CPT | Mod: S$GLB,,, | Performed by: FAMILY MEDICINE

## 2023-08-04 PROCEDURE — 99396 PR PREVENTIVE VISIT,EST,40-64: ICD-10-PCS | Mod: S$GLB,,, | Performed by: FAMILY MEDICINE

## 2023-08-04 PROCEDURE — 99999 PR PBB SHADOW E&M-EST. PATIENT-LVL V: CPT | Mod: PBBFAC,,, | Performed by: FAMILY MEDICINE

## 2023-08-04 RX ORDER — MONTELUKAST SODIUM 10 MG/1
10 TABLET ORAL
COMMUNITY
Start: 2023-05-12 | End: 2023-08-09

## 2023-08-04 NOTE — PROGRESS NOTES
HISTORY OF PRESENT ILLNESS: Mr. Hickey comes in today for annual wellness examination.  He is not fasting and has not taken medications today. He reports no acute problems today.     END OF LIFE DECISION:He does not have a living will. He does desire life support.    Current Outpatient Medications   Medication Sig    fluticasone propionate (FLONASE) 50 mcg/actuation nasal spray 2 sprays (100 mcg total) by Each Nostril route once daily.    metoprolol succinate (TOPROL-XL) 50 MG 24 hr tablet Take 1 tablet (50 mg total) by mouth once daily.    montelukast (SINGULAIR) 10 mg tablet Take 10 mg by mouth.    rosuvastatin (CRESTOR) 5 MG tablet TAKE 1 TABLET BY MOUTH EVERY DAY IN THE EVENING    sertraline (ZOLOFT) 50 MG tablet Take 1 tablet (50 mg total) by mouth once daily.      SCREENINGS:    Cholesterol w/CMPl: August 4, 2022.  Colonoscopy: Never. He desires.  Prostate Exam/PSA: August 3, 2023 - 2.2.   Eye Exam: [facility] 2023 per patient. He glasses.  PPD: Negative the past.     Immunizations:    Shingrix: Never.  Reports no history of varicella or zoster.  Tdap: December 10, 2019.  Flu shot: December 10, 2019.  Covid-19 (Pfizer) vaccine series - March 21, 2021, April 11, 2021, January 29, 2022.        ROS:  GENERAL: No fever, chills, fatigue or unusual weight change. Appetite normal. Exercises not leisurely but moves around daily at job. Monitors diet does. Weight 119.7 kg (263 lb 14.3 oz) at March 3, 2023 visit.   SKIN: No rashes, itching, changes in mole, color or texture of skin or easy bruising.  HEAD: No headaches or recent head trauma.  EYES: No change in vision,   no pain, diplopia, redness or discharge.  EARS: Denies ear pain, discharge, vertigo or decreased hearing.  NOSE: No epistaxis or rhinitis. Nontender external nose.  MOUTH & THROAT: No hoarseness or change in voice. No excessive gum bleeding or mouth sores.  No sore throat.  NODES: Denies swollen glands.  CHEST: Denies KENNEY, wheezing, cough, hemoptysis or sputum  "production.  CARDIOVASCULAR: Denies chest pain, palpitations. Saw Dr. Grajeda, cardiologist, on January 11, 2022 for CAD, SVT, HTN, HLD surveillance with 6-month follow up advised. Does not perform home blood pressure checks.  ABDOMEN: Denies diarrhea, constipation, vomiting, abdominal pain, or blood in stool.  GENITOURINARY: No flank pain, dysuria or hematuria.No nocturia or frequency. No lesions, pain or swelling in genital area. Performs monthly self testicular exam does. Saw NP Mary Rodriguez with urology on June 30, 2022 for right flank pain, nephrolithiasis with renal ultrasound performed and 1-year follow up with ultrasound advised and performed on June 28, 2023 which was abnormal and subsequent CT urogram Abdomen Pelvic and MRI Prostate abnormal with follow up with Dr. SADIQ Willis, urologist, scheduled for August 8, 2023.   ENDOCRINE: Denies diabetes, thyroid or cholesterol problems. Performs home glucose checks: N./A  HEME/LYMPH: Denies bleeding problems.  PERIPHERAL VASCULAR: No claudication or cyanosis  MUSCULOSKELETAL: No joint pain or swelling. No edema. But reports occasional knee(s) stiffness with pain but okay today.  NEUROLOGIC: No history of seizures, tremors, alteration of gait or coordination.  PSYCHIATRIC: Denies mood swings, depression, anxiety, homicidal or suicidal thoughts. Denies sleep problems.     PE:   VS:  Blood Pressure 126/86   Pulse 96   Temperature 98.7 °F (37.1 °C) (Oral)   Height 5' 10" (1.778 m)   Weight 119.5 kg (263 lb 7.2 oz)   Oxygen Saturation 97%   Body Mass Index 37.80 kg/m²   APPEARANCE:  Well nourished, well developed male, pleasant and obese, alert and oriented in no acute distress.    HEAD: Non tender . Full range of motion.  EYES: PERRL, conjunctiva pink, lids no edema.  EARS: External canal patent, no swelling or redness. TM's shiny and clear.  NOSE: Mucosa and turbinates pink, not swollen. No discharge  THROAT: No pharyngeal erythema or exudate. No stridor.    NECK: " Supple, no mass, thyroid not enlarged. No carotid bruit.  NODES: No cervical, axillary inguinal lymph node enlargement.  CHEST: Normal respiratory effort. Lungs clear to auscultation.  CARDIOVASCULAR: Normal S1, S2. No rubs, murmurs or gallops.No edema.Pedal pulses palpable bilaterally.  ABDOMEN: Bowel sounds present. Not distended. Soft. No tenderness, masses or organomegaly.  BREAST:  Nontender, no asymmetry, nipple discharge, abnormal masses, nodules, lumps.  GENITALIA: Not examined per patient request.  RECTAL: Not examined per patient request.  MUSCULOSKELETAL: No joint deformities or stiffness. He is ambulatory without problems.  SKIN: No rashes or suspicious lesions, normal color and turgor.  NEUROLOGIC: Cranial Nerves: II-XII grossly intact.  DTR's: Knees, Ankles 2+ and equal bilaterally Gait & Posture: Normal gait and fine motion.  PSYCHIATRIC: Patient alert, oriented x 3. Mood/Affect normal without acute anxiety and depression noted.Judgement and insight-good as he makes appropriate decisions on today's examination.    ASSESSMENT:    ICD-10-CM ICD-9-CM    1. Annual physical exam  Z00.00 V70.0 CBC Auto Differential      TSH      Comprehensive Metabolic Panel      Lipid Panel      Hemoglobin A1C      2. Essential hypertension  I10 401.9       3. Hypercholesterolemia  E78.00 272.0       4. CAD in native artery  I25.10 414.01       5. Severe obesity (BMI 35.0-39.9) with comorbidity  E66.01 278.01       6. Colon cancer screening  Z12.11 V76.51 Ambulatory referral/consult to Endo Procedure           PLAN:   1. Age-appropriate counseling-appropriate low-sodium, low-cholesterol, low carbohydrate diet and exercise daily, monthly self testicular examination, annual wellness examination.  2. Patient advised to call for results.  3. Continue current medications.  4. Keep follow up with specialists.  5. Flu shot this fall.  6. Follow up in about 1 year (around 8/5/2024) for physical.

## 2023-08-08 ENCOUNTER — HOSPITAL ENCOUNTER (OUTPATIENT)
Dept: PREADMISSION TESTING | Facility: HOSPITAL | Age: 63
Discharge: HOME | End: 2023-08-08
Attending: INTERNAL MEDICINE
Payer: COMMERCIAL

## 2023-08-08 ENCOUNTER — OFFICE VISIT (OUTPATIENT)
Dept: UROLOGY | Facility: CLINIC | Age: 63
End: 2023-08-08
Payer: COMMERCIAL

## 2023-08-08 VITALS
DIASTOLIC BLOOD PRESSURE: 80 MMHG | HEIGHT: 70 IN | HEART RATE: 73 BPM | BODY MASS INDEX: 37.98 KG/M2 | SYSTOLIC BLOOD PRESSURE: 153 MMHG | WEIGHT: 265.31 LBS

## 2023-08-08 DIAGNOSIS — D41.4 NEOPLASM OF UNCERTAIN BEHAVIOR OF BLADDER: ICD-10-CM

## 2023-08-08 DIAGNOSIS — N40.0 BPH WITHOUT OBSTRUCTION/LOWER URINARY TRACT SYMPTOMS: ICD-10-CM

## 2023-08-08 DIAGNOSIS — Z12.11 COLON CANCER SCREENING: Primary | ICD-10-CM

## 2023-08-08 DIAGNOSIS — J30.2 OTHER SEASONAL ALLERGIC RHINITIS: ICD-10-CM

## 2023-08-08 DIAGNOSIS — R97.20 ELEVATED PSA: ICD-10-CM

## 2023-08-08 DIAGNOSIS — N28.1 RENAL CYST: ICD-10-CM

## 2023-08-08 DIAGNOSIS — Z87.442 HISTORY OF RENAL STONE: ICD-10-CM

## 2023-08-08 DIAGNOSIS — R93.5 ABNORMAL MRI, PELVIS: Primary | ICD-10-CM

## 2023-08-08 LAB — POC RESIDUAL URINE VOLUME: 34 ML (ref 0–100)

## 2023-08-08 PROCEDURE — 99214 OFFICE O/P EST MOD 30 MIN: CPT | Mod: S$GLB,,, | Performed by: UROLOGY

## 2023-08-08 PROCEDURE — 99999 PR PBB SHADOW E&M-EST. PATIENT-LVL III: CPT | Mod: PBBFAC,,, | Performed by: UROLOGY

## 2023-08-08 PROCEDURE — 99214 PR OFFICE/OUTPT VISIT, EST, LEVL IV, 30-39 MIN: ICD-10-PCS | Mod: S$GLB,,, | Performed by: UROLOGY

## 2023-08-08 PROCEDURE — 51798 POCT BLADDER SCAN: ICD-10-PCS | Mod: S$GLB,,, | Performed by: UROLOGY

## 2023-08-08 PROCEDURE — 99999 PR PBB SHADOW E&M-EST. PATIENT-LVL III: ICD-10-PCS | Mod: PBBFAC,,, | Performed by: UROLOGY

## 2023-08-08 PROCEDURE — 51798 US URINE CAPACITY MEASURE: CPT | Mod: S$GLB,,, | Performed by: UROLOGY

## 2023-08-08 RX ORDER — SODIUM, POTASSIUM,MAG SULFATES 17.5-3.13G
1 SOLUTION, RECONSTITUTED, ORAL ORAL DAILY
Qty: 1 KIT | Refills: 0 | Status: SHIPPED | OUTPATIENT
Start: 2023-08-08 | End: 2023-08-10

## 2023-08-08 NOTE — PROGRESS NOTES
Chief Complaint:   Encounter Diagnoses   Name Primary?    Abnormal MRI, pelvis Yes    Renal cyst     Elevated PSA     Neoplasm of uncertain behavior of bladder     History of renal stone     BPH without obstruction/lower urinary tract symptoms        HPI:  HPI Rob Hickey Jr. arvin 62 y.o. male who presents with follow up to abnormal imaging.  Patient had a ureteral stone couple years ago he was following up with urology for that purpose.  On his renal ultrasound there was noted to be multiple renal cysts and an abnormal finding in his bladder suggesting a 4 mm polyp.  This prompted a CT urogram.  The cysts were deemed to be simple.  There was no renal stones.  No bladder mass was seen.  There was an abnormal finding in the left seminal vesicle.  This prompted an MRI of his prostate and pelvis.  There was no abnormality noted with the seminal vesicle however there was a 2 cm lesion within the prostate suspicious for prostate cancer.  Patient has been getting routine prostate cancer screening.  His PSA history shows PSA 2 years ago was 0.9 and last year was 1.5 and this year was 2.2.  He has no lower urinary tract symptoms.  He is had no further flank pain or hematuria.    History:  Past Medical History:   Diagnosis Date    Anxiety disorder, unspecified     CAD in native artery 10/05/2020    Hypercholesterolemia 10/05/2020    Hypertension     Obesity (BMI 35.0-39.9 without comorbidity) 08/10/2020    SVT (supraventricular tachycardia) 05/04/2021     Past Surgical History:   Procedure Laterality Date    None       Family History   Problem Relation Age of Onset    No Known Problems Sister     Heart disease Brother 45        MI/CAD    Heart disease Sister 23        MI/CAD    Cancer Neg Hx     Diabetes Neg Hx     Stroke Neg Hx     Hypertension Neg Hx        Current Outpatient Medications on File Prior to Visit   Medication Sig Dispense Refill    fluticasone propionate (FLONASE) 50 mcg/actuation nasal spray 2 sprays (100  "mcg total) by Each Nostril route once daily. 48 g 1    metoprolol succinate (TOPROL-XL) 50 MG 24 hr tablet Take 1 tablet (50 mg total) by mouth once daily. 90 tablet 4    montelukast (SINGULAIR) 10 mg tablet Take 10 mg by mouth.      rosuvastatin (CRESTOR) 5 MG tablet TAKE 1 TABLET BY MOUTH EVERY DAY IN THE EVENING 90 tablet 3    sertraline (ZOLOFT) 50 MG tablet Take 1 tablet (50 mg total) by mouth once daily. 30 tablet 5     No current facility-administered medications on file prior to visit.        Objective:     Vitals:    08/08/23 1301   BP: (!) 153/80   Pulse: 73   Weight: 120.4 kg (265 lb 5.2 oz)   Height: 5' 10" (1.778 m)      BMI Readings from Last 1 Encounters:   08/08/23 38.07 kg/m²          Physical Exam  Digital rectal exam reveals a 40 g smooth prostate      Lab Results   Component Value Date    PSA 2.2 08/03/2023    PSA 1.5 08/04/2022    PSA 0.93 09/08/2020        Lab Results   Component Value Date    CREATININE 1.0 08/03/2023      Assessment:       1. Abnormal MRI, pelvis    2. Renal cyst    3. Elevated PSA    4. Neoplasm of uncertain behavior of bladder    5. History of renal stone    6. BPH without obstruction/lower urinary tract symptoms        Plan:     1. Abnormal MRI, pelvis    2. Renal cyst    3. Elevated PSA    4. Neoplasm of uncertain behavior of bladder    5. History of renal stone    6. BPH without obstruction/lower urinary tract symptoms           Discussed extensively the progression of imaging and the findings that were urologically related.  Due to the bladder polyp I am recommending a cystoscopy.  Due to his rising PSA and abnormal MRI findings patient will need a prostate biopsy.  Depending on cystoscopy findings we may do this at the same setting under anesthesia.  He has no renal stones and simple renal cysts which no follow up as necessary for.  We will proceed with cystoscopy and make decisions from there.  Discussed risks and benefits of both cystoscopy and prostate biopsy.  "

## 2023-08-08 NOTE — TELEPHONE ENCOUNTER
Refill Routing Note   Medication(s) are not appropriate for processing by [facility] for the following reason(s):      No active prescription written by provider    [facility] action(s):  Defer Care Due:  None identified            Appointments  past 12m or future 3m with PCP    Date Provider   Last Visit   8/4/2023 Mary Bruner MD   Next Visit   Visit date not found Mary Bruner MD   ED visits in past 90 days: 0        Note composed:4:20 PM 08/08/2023

## 2023-08-08 NOTE — TELEPHONE ENCOUNTER
No care due was identified.  [facility] Embedded Care Due Messages. Reference number: [id number].   8/08/2023 12:20:05 AM CDT

## 2023-08-09 ENCOUNTER — TELEPHONE (OUTPATIENT)
Dept: FAMILY MEDICINE | Facility: CLINIC | Age: 63
End: 2023-08-09
Payer: COMMERCIAL

## 2023-08-09 DIAGNOSIS — E11.9 NEW ONSET TYPE 2 DIABETES MELLITUS: Primary | ICD-10-CM

## 2023-08-09 RX ORDER — MONTELUKAST SODIUM 10 MG/1
10 TABLET ORAL NIGHTLY
Qty: 90 TABLET | Refills: 1 | Status: SHIPPED | OUTPATIENT
Start: 2023-08-09

## 2023-08-09 RX ORDER — LANCETS
EACH MISCELLANEOUS
Qty: 50 EACH | Refills: 11 | Status: SHIPPED | OUTPATIENT
Start: 2023-08-09

## 2023-08-09 RX ORDER — INSULIN PUMP SYRINGE, 3 ML
EACH MISCELLANEOUS
Qty: 1 EACH | Refills: 0 | Status: SHIPPED | OUTPATIENT
Start: 2023-08-09

## 2023-08-09 NOTE — TELEPHONE ENCOUNTER
Left voice message for the pt to return call to get lab results. Also, informed on voice mail he can check his portal as well.

## 2023-08-09 NOTE — TELEPHONE ENCOUNTER
Advise pt     Your lab results are within acceptable range except you have diabetes. At this time, I recommend you see/consult with diabetic nutritionist for diabetic teaching.  I also recommend you check your glucose (fingerstick) once daily (fasting or 2-hours after meal with goal 70 to 150). I would like to see you in 4 months for diabetes recheck. Please continue your current medications.     Thanks.

## 2023-08-16 ENCOUNTER — CLINICAL SUPPORT (OUTPATIENT)
Dept: DIABETES | Facility: CLINIC | Age: 63
End: 2023-08-16
Attending: FAMILY MEDICINE
Payer: COMMERCIAL

## 2023-08-16 VITALS — BODY MASS INDEX: 37.36 KG/M2 | WEIGHT: 260.38 LBS

## 2023-08-16 DIAGNOSIS — E11.9 NEW ONSET TYPE 2 DIABETES MELLITUS: ICD-10-CM

## 2023-08-16 PROCEDURE — G0108 DIAB MANAGE TRN  PER INDIV: HCPCS | Mod: S$GLB,,, | Performed by: DIETITIAN, REGISTERED

## 2023-08-16 PROCEDURE — 99999 PR PBB SHADOW E&M-EST. PATIENT-LVL III: ICD-10-PCS | Mod: PBBFAC,,, | Performed by: DIETITIAN, REGISTERED

## 2023-08-16 PROCEDURE — G0108 PR DIAB MANAGE TRN  PER INDIV: ICD-10-PCS | Mod: S$GLB,,, | Performed by: DIETITIAN, REGISTERED

## 2023-08-16 PROCEDURE — 99999 PR PBB SHADOW E&M-EST. PATIENT-LVL III: CPT | Mod: PBBFAC,,, | Performed by: DIETITIAN, REGISTERED

## 2023-08-16 NOTE — PROGRESS NOTES
Diabetes Care Specialist Progress Note  Author: Jacque Beck RD, CDE  Date: 8/16/2023    Program Intake  Reason for Diabetes Program Visit:: Initial Diabetes Assessment (DM referral 08/09/2023 Mary Bruner MD)  Type of Intervention:: Individual  Education: Self-Management Skill Review, Nutrition and Meal Planning  Current diabetes risk level:: low  In the last 12 months, have you::  (pt denies ER/hosp visits)    Lab Results   Component Value Date    HGBA1C 6.8 (H) 08/08/2023       Clinical    Patient Health Rating  Compared to other people your age, how would you rate your health?: Good    Problem Review  Active comorbidities affecting diabetes self-care.: yes (HTN, HLD, CAD, Obesity by BMI)    Clinical Assessment  Current Diabetes Treatment: Diet, Exercise  Have you ever experienced hypoglycemia (low blood sugar)?: no  Have you ever experienced hyperglycemia (high blood sugar)?: no    Labs  Do you have regular lab work to monitor your medications?: Yes  Type of Regular Lab Work: A1c, BMP, Cholesterol  Where do you get your labs drawn?: [facility]  Lab Compliance Barriers: No    Nutritional Status  Diet:  (Pt reports 2meals w/ snacks. Excess carb, sat fat and sodium from processed foods, refined starches, portions. Inadequate nonstarchy vegetables but likes variety.)  Meal Plan 24 Hour Recall - Breakfast: none  Meal Plan 24 Hour Recall - Lunch: turkey wings (sometime sandwich- honey wheat or white bread) and water  Meal Plan 24 Hour Recall - Dinner: [employer] 4 finger and water  Meal Plan 24 Hour Recall - Snack: cookies  Change in appetite?: No  Dentation:: Intact  Recent Changes in Weight: Weight Gain  Current nutritional status an area of need that is impacting patient's ability to self-manage diabetes?: Yes    Additional Social History    Support  Does anyone support you with your diabetes care?: no  Is Support an area impacting ability to self-manage diabetes?: No    Access to [employer]  [employer]  Does the patient have access to any of the following devices or technologies?: Smart phone, Internet Access  Media or technology needs impacting ability to self-manage diabetes?: No    Cognitive/Behavioral Health  Alert and Oriented: Yes  Difficulty Thinking: No  Requires Prompting: No  Requires assistance for routine expression?: No  Cognitive or behavioral barriers impacting ability to self-manage diabetes?: No    Culture/Taoist  Culture or Quaker beliefs that may impact ability to access healthcare: No    Communication  Language preference: English  Hearing Problems: No  Vision Problems: Yes  Vision problem type:: Decreased Vision  Vision Assistance: Glasses  Communication needs impacting ability to self-manage diabetes?: No    Health Literacy  Preferred Learning Method: Reading Materials, Face to Face, Demonstration, Hands On  How often do you need to have someone help you read instructions, pamphlets, or written material from your doctor or pharmacy?: Never  Health literacy needs impacting ability to self-manage diabetes?: No      Diabetes Self-Management Skills Assessment    Diabetes Disease Process/Treatment Options  Patient/caregiver able to state what happens when someone has diabetes.: no  Patient/caregiver knows what type of diabetes they have.: no  Patient/caregiver able to identify at least three signs and symptoms of diabetes.: no  Patient able to identify at least three risk factors for diabetes.: no  Diabetes Disease Process/Treatment Options: Skills Assessment Completed: Yes  Assessment indicates:: Knowledge deficit, Instruction Needed  Area of need?: Yes    Nutrition/Healthy Eating  Challenges to healthy eating::  (processed foods, refined starches, portions)  Method of carbohydrate measurement:: no method  Patient can identify foods that impact blood sugar.: yes  Patient-identified foods:: sweets, starches (bread, pasta, rice, cereal)  Nutrition/Healthy Eating Skills Assessment  Completed:: Yes  Assessment indicates:: Instruction Needed, Knowledge deficit  Area of need?: Yes    Physical Activity/Exercise  Patient's daily activity level:: constantly moving (. >10K steps/d.)  Patient can identify forms of physical activity.: yes  Stated forms of physical activity:: manual activity at work, moving to burn calories  Patient can identify reasons why exercise/physical activity is important in diabetes management.: no  Physical Activity/Exercise Skills Assessment Completed: : Yes  Assessment indicates:: Instruction Needed, Knowledge deficit  Area of need?: Yes    Home Blood Glucose Monitoring  Patient states that blood sugar is checked at home daily.: yes  Monitoring Method:: home glucometer  Home glucometer meter type:: Unknown - pt reports having meter/supplies at home and plans to start testing; he reports understanding how to use meter.  Home Blood Glucose Monitoring Skills Assessment Completed: : Yes  Assessment indicates:: Adequate understanding  Area of need?: Yes    Acute Complications  Patient is able to identify types of acute complications: No  Acute Complications Skills Assessment Completed: : Yes  Assessment indicates:: Knowledge deficit, Instruction Needed  Area of need?: Yes    Chronic Complications  Patient can identify major chronic complications of diabetes.: yes  Stated chronic complications:: retinopathy, kidney disease (amputation)  Patient is aware that having diabetes increases risk of heart disease?: No  Patient is aware that heart disease is the leading cause of death and disability in people with diabetes?: No  Patient is taking statin?: Yes  Chronic Complications Skills Assessment Completed: : Yes  Assessment indicates:: Instruction Needed, Knowledge deficit  Area of need?: Yes    Psychosocial/Coping  Patient can identify ways of coping with chronic disease.: yes  Patient-stated ways of coping with chronic disease:: support from loved  ones  Psychosocial/Coping Skills Assessment Completed: : Yes  Assessment indicates:: Adequate understanding  Area of need?: No    Assessment Summary and Plan  Based on today's diabetes care assessment, the following areas of need were identified:      Social 8/16/2023   Support No   Access to Mass Media/Tech No   Cognitive/Behavioral Health No   Culture/Yarsanism No   Communication No   Health Literacy No      Clinical 8/16/2023   Lab Compliance No   Nutritional Status Yes - see care plan      Diabetes Self-Management Skills 8/16/2023   Diabetes Disease Process/Treatment Options Yes  Reviewed diabetes pathophysiology, different types of diabetes, signs and symptoms, risk factors and treatment guidelines.    Nutrition/Healthy Eating Yes - see care plan   Physical Activity/Exercise Yes  Discussed importance of daily physical activity with review of benefits, methods, guidelines and precautions.   Home Blood Glucose Monitoring Yes - see care plan   Acute Complications Yes  Discussed prevention, identification signs/symptoms and treatment of hyperglycemia and hypoglycemia and when to contact clinic.    Chronic Complications Yes  Reviewed diabetes complications and preventative screenings including annual dilated eye exams, regular dental exams, and daily foot self-exams. Reviewed foot care guidelines.   Discussed current A1c, Blood Pressure, and Cholesterol results (ABC's of Diabetes) and ADA target guidelines.   Psychosocial/Coping No         Today's interventions were provided through individual discussion, instruction, and written materials were provided.    Patient verbalized understanding of instruction and written materials.  Pt was able to return back demonstration of instructions today. Patient understood key points, needs reinforcement and further instruction.     Diabetes Self-Management Care Plan:  Today's Diabetes Self-Management Care Plan was developed with Rob's input. Rob has agreed to work toward the  following goal(s) to improve his/her overall diabetes control.      Care Plan: Diabetes Management   Updates made since 7/17/2023 12:00 AM        Problem: Healthy Eating         Goal: Eat 3 meals daily - manage carb 45grams/meal, 5-15grams/snacks.    Start Date: 8/16/2023   Expected End Date: 8/9/2024   Priority: High   Barriers: No Barriers Identified        Task: Reviewed the sources and role of Carbohydrate, Protein, and Fat and how each nutrient impacts blood sugar. Completed 8/16/2023        Task: Provided visual examples using dry measuring cups, food models, and other familiar objects such as computer mouse, deck or cards, tennis ball etc. to help with visualization of portions. Completed 8/16/2023        Task: Discussed strategies for choosing healthier menu options when dining out. Completed 8/16/2023        Task: Review the importance of balancing carbohydrates with each meal using portion control techniques to count servings of carbohydrate and label reading to identify serving size and amount of total carbs per serving. Completed 8/16/2023        Task: Provided Sample plate method and reviewed the use of the plate to estimate amounts of carbohydrate per meal. Completed 8/16/2023        Problem: Blood Glucose Self-Monitoring         Goal: Patient agrees to check and record blood sugars 1 time per day (fst, 2hr pp) & bring meter/records to clinic.    Start Date: 8/16/2023   Expected End Date: 8/9/2024   Priority: Medium   Barriers: No Barriers Identified        Task: Reviewed the importance of self-monitoring blood glucose and keeping logs. Completed 8/16/2023        Task: Reviewed appropriate timing and frequency to SMBG, education on parameters on when to notify provider and advised patient to bring logs to all appts with PCP/Endocrinologist/Diabetes Care Specialist. Completed 8/16/2023          Follow Up Plan   Follow up in about 4 weeks (around 9/13/2023).  -review BG logs   -eval goals    Today's care  plan and follow up schedule was discussed with patient.  Rob verbalized understanding of the care plan, goals, and agrees to follow up plan.        The patient was encouraged to communicate with his/her health care provider/physician and care team regarding his/her condition(s) and treatment.  I provided the patient with my contact information today and encouraged to contact me via phone or [facility] as needed.     Length of Visit   Total Time: 60 Minutes

## 2023-08-22 ENCOUNTER — PROCEDURE VISIT (OUTPATIENT)
Dept: UROLOGY | Facility: CLINIC | Age: 63
End: 2023-08-22
Payer: COMMERCIAL

## 2023-08-22 VITALS
HEART RATE: 69 BPM | SYSTOLIC BLOOD PRESSURE: 137 MMHG | WEIGHT: 260.38 LBS | HEIGHT: 70 IN | DIASTOLIC BLOOD PRESSURE: 82 MMHG | RESPIRATION RATE: 16 BRPM | BODY MASS INDEX: 37.28 KG/M2

## 2023-08-22 DIAGNOSIS — D41.4 NEOPLASM OF UNCERTAIN BEHAVIOR OF BLADDER: Primary | ICD-10-CM

## 2023-08-22 PROCEDURE — 52000 CYSTOURETHROSCOPY: CPT | Mod: S$GLB,,, | Performed by: UROLOGY

## 2023-08-22 PROCEDURE — 52000 CYSTOSCOPY: ICD-10-PCS | Mod: S$GLB,,, | Performed by: UROLOGY

## 2023-08-22 RX ORDER — CIPROFLOXACIN 500 MG/1
500 TABLET ORAL
Status: COMPLETED | OUTPATIENT
Start: 2023-08-22 | End: 2023-08-22

## 2023-08-22 RX ORDER — LIDOCAINE HYDROCHLORIDE 20 MG/ML
JELLY TOPICAL
Status: COMPLETED | OUTPATIENT
Start: 2023-08-22 | End: 2023-08-22

## 2023-08-22 RX ADMIN — LIDOCAINE HYDROCHLORIDE 10 ML: 20 JELLY TOPICAL at 10:08

## 2023-08-22 RX ADMIN — CIPROFLOXACIN 500 MG: 500 TABLET ORAL at 10:08

## 2023-08-22 NOTE — PROCEDURES
Cystoscopy    Date/Time: 8/22/2023 10:30 AM    Performed by: Rob Willis MD  Authorized by: Rob Willis MD    Consent Done?:  Yes (Written)  Anesthesia:  Intraurethral instillation  Local anesthetic:  Lidocaine 2% topical gel  Position:  Supine  Anesthesia:  Intraurethral instillation  Scope type:  Flexible cystoscope  Comments:          Cystoscopy  Preop Dx: bladder neoplasm  Post op dx : bph  Procedure: cystoscopy  EBL none  Anesth, local urojet lidocaine 2% gel  Time out verified  Findngs : trabeculated bladder/bph    After informed consent, and preoperative antibiotic positioned in supine position.  Genitalia prepped and draped sterilely.  A 17 Ivorian flexible cystoscope was passed through the urethra into the bladder.  Systematic examination revealed moderate trabeculation.  Bilateral ureteral orifices were seen.  No urothelial lesion was seen.  Scope was retroflexed and mild BPH was noted.  Prostate were noted to be moderately enlarged with an approximate length of 4 cm. The scope was removed.  He tolerated procedure well.     Discussed with normal cystoscopy I still recommend workup of his abnormal MRI of his prostate and elevated PSA.  We will schedule uronav prostate biopsy.  Discussed risks and benefits.

## 2023-08-22 NOTE — PROGRESS NOTES
Uro-brett scheduled for 08/24/2023  Pre-procedure instructions were gone over both verbally and written.   Patient voiced understanding and had no questions.    Minerva Mccord LPN

## 2023-08-22 NOTE — PROGRESS NOTES
Cipro 500 mg given orally per Dr. Willis prior to procedure for prophylaxis.  Lidocaine Jelly 2% given prophylactically via penis/urethra to aid in comfort during procedure. Patient tolerated well.    Minerva Mccord LPN

## 2023-08-23 DIAGNOSIS — R93.5 ABNORMAL MRI, PELVIS: Primary | ICD-10-CM

## 2023-08-23 DIAGNOSIS — R97.20 ELEVATED PSA: ICD-10-CM

## 2023-08-24 ENCOUNTER — TELEPHONE (OUTPATIENT)
Dept: UROLOGY | Facility: CLINIC | Age: 63
End: 2023-08-24
Payer: COMMERCIAL

## 2023-08-24 ENCOUNTER — PROCEDURE VISIT (OUTPATIENT)
Dept: UROLOGY | Facility: CLINIC | Age: 63
End: 2023-08-24
Payer: COMMERCIAL

## 2023-08-24 VITALS
RESPIRATION RATE: 22 BRPM | BODY MASS INDEX: 36.96 KG/M2 | TEMPERATURE: 98 F | DIASTOLIC BLOOD PRESSURE: 90 MMHG | HEIGHT: 70 IN | SYSTOLIC BLOOD PRESSURE: 150 MMHG | HEART RATE: 67 BPM | WEIGHT: 258.19 LBS

## 2023-08-24 DIAGNOSIS — R97.20 ELEVATED PSA: Primary | ICD-10-CM

## 2023-08-24 DIAGNOSIS — R93.5 ABNORMAL MRI, PELVIS: ICD-10-CM

## 2023-08-24 LAB
BILIRUB SERPL-MCNC: NEGATIVE MG/DL
BLOOD URINE, POC: NORMAL
CLARITY, POC UA: CLEAR
COLOR, POC UA: YELLOW
GLUCOSE UR QL STRIP: NEGATIVE
KETONES UR QL STRIP: NEGATIVE
LEUKOCYTE ESTERASE URINE, POC: NEGATIVE
NITRITE, POC UA: NEGATIVE
PH, POC UA: 6
PROTEIN, POC: 30
SPECIFIC GRAVITY, POC UA: 1.03
UROBILINOGEN, POC UA: 0.2

## 2023-08-24 PROCEDURE — 88342 CHG IMMUNOCYTOCHEMISTRY: ICD-10-PCS | Mod: 26,,, | Performed by: PATHOLOGY

## 2023-08-24 PROCEDURE — 55700 PR BIOPSY OF PROSTATE,NEEDLE/PUNCH: ICD-10-PCS | Mod: S$GLB,,, | Performed by: UROLOGY

## 2023-08-24 PROCEDURE — 88341 IMHCHEM/IMCYTCHM EA ADD ANTB: CPT | Mod: 59 | Performed by: PATHOLOGY

## 2023-08-24 PROCEDURE — 76872 TRUS: ICD-10-PCS | Mod: 26,S$GLB,, | Performed by: UROLOGY

## 2023-08-24 PROCEDURE — 88305 TISSUE EXAM BY PATHOLOGIST: CPT | Mod: 26,,, | Performed by: PATHOLOGY

## 2023-08-24 PROCEDURE — 96372 THER/PROPH/DIAG INJ SC/IM: CPT | Mod: 59,S$GLB,, | Performed by: UROLOGY

## 2023-08-24 PROCEDURE — 88342 IMHCHEM/IMCYTCHM 1ST ANTB: CPT | Mod: 26,,, | Performed by: PATHOLOGY

## 2023-08-24 PROCEDURE — 96372 PR INJECTION,THERAP/PROPH/DIAG2ST, IM OR SUBCUT: ICD-10-PCS | Mod: 59,S$GLB,, | Performed by: UROLOGY

## 2023-08-24 PROCEDURE — 88341 PR IHC OR ICC EACH ADD'L SINGLE ANTIBODY  STAINPR: ICD-10-PCS | Mod: 26,,, | Performed by: PATHOLOGY

## 2023-08-24 PROCEDURE — 81002 URINALYSIS NONAUTO W/O SCOPE: CPT | Mod: S$GLB,,, | Performed by: UROLOGY

## 2023-08-24 PROCEDURE — 81002 POCT URINE DIPSTICK WITHOUT MICROSCOPE: ICD-10-PCS | Mod: S$GLB,,, | Performed by: UROLOGY

## 2023-08-24 PROCEDURE — 88305 TISSUE EXAM BY PATHOLOGIST: CPT | Mod: 59 | Performed by: PATHOLOGY

## 2023-08-24 PROCEDURE — 88341 IMHCHEM/IMCYTCHM EA ADD ANTB: CPT | Mod: 26,,, | Performed by: PATHOLOGY

## 2023-08-24 PROCEDURE — 76872 US TRANSRECTAL: CPT | Mod: 26,S$GLB,, | Performed by: UROLOGY

## 2023-08-24 PROCEDURE — 88305 TISSUE EXAM BY PATHOLOGIST: ICD-10-PCS | Mod: 26,,, | Performed by: PATHOLOGY

## 2023-08-24 PROCEDURE — 55700 PR BIOPSY OF PROSTATE,NEEDLE/PUNCH: CPT | Mod: S$GLB,,, | Performed by: UROLOGY

## 2023-08-24 PROCEDURE — 88342 IMHCHEM/IMCYTCHM 1ST ANTB: CPT | Performed by: PATHOLOGY

## 2023-08-24 RX ORDER — CEFTRIAXONE 1 G/1
1 INJECTION, POWDER, FOR SOLUTION INTRAMUSCULAR; INTRAVENOUS
Status: COMPLETED | OUTPATIENT
Start: 2023-08-24 | End: 2023-08-24

## 2023-08-24 RX ORDER — LIDOCAINE HYDROCHLORIDE 20 MG/ML
JELLY TOPICAL
Status: COMPLETED | OUTPATIENT
Start: 2023-08-24 | End: 2023-08-24

## 2023-08-24 RX ADMIN — CEFTRIAXONE 1 G: 1 INJECTION, POWDER, FOR SOLUTION INTRAMUSCULAR; INTRAVENOUS at 01:08

## 2023-08-24 RX ADMIN — LIDOCAINE HYDROCHLORIDE: 20 JELLY TOPICAL at 01:08

## 2023-08-24 NOTE — PROCEDURES
"TRUS    Date/Time: 8/24/2023 1:15 PM    Performed by: Rob Willis MD  Authorized by: Rob Willis MD    Indications: Elevated PSA    Position:  Left lateral  Anesthesia:  20cc's 1% Lidocaine and Lidocaine jelly  Patient sedated: No    Total Biopsies:  0     Procedure: (1) Transrectal Prostate Biopsy                      (2) Transrectal ultrasound of prostate                     (3) Ultrasound Guidance of Prostate Biopsy needle                      (4) U/s MRI fusion guidance    Detail: After proper consents were obtained, patient was predmedicated with 1 Gram of IM Rocephin,  the patient was prepped and draped in normal fashion in the left lateral decubitus position for a prostate biopsy. A "time out" was performed. 5 ml of lidocaine jelly was instilled in the rectum.  The transrectal ultrasound probe was passed gently into the rectum.  The ultrasound was used to size the prostate.  A spinal needle was used and 10 ml of 1% lidocaine was injected at the junction of the prostate at the base of the seminal vesicles bilaterally.  At this point MRI fusion using the Uronav was performed.  The prostate was scanned sagittally and fused with the MRI imaging.  The target zone in the right base of the prostate was noted.  Biopsy was then performed using an 18Ga biopsy needle directed at the target lesion and then routine non targeted biopsies were taken from the lateral aspect of the base, mid and apex bilaterally for a total of 12 cores.     Findings: The prostate is measured for volume of 42.5  grams    Discussed instructions and expectations  - blood per rectum, in urine, and in ejaculate are common  - instructed to present to ED or call for fevers >101F, inability to void, or other issues   - follow up in 1-2 weeks for discussion of pathology     "

## 2023-08-24 NOTE — TELEPHONE ENCOUNTER
Spoke with pt family member in regard to FU. Changed to [place] location vs [place]. Asked about pain medication, advised we typically do not prescribe pain medication after the biopsies, he can take tylenol. Nothing that has blood thinning properties such as aspirin, advil or naproxen. Caller v/u

## 2023-08-29 LAB
COMMENT: NORMAL
FINAL PATHOLOGIC DIAGNOSIS: NORMAL
GROSS: NORMAL
Lab: NORMAL

## 2023-09-05 ENCOUNTER — OFFICE VISIT (OUTPATIENT)
Dept: UROLOGY | Facility: CLINIC | Age: 63
End: 2023-09-05
Payer: COMMERCIAL

## 2023-09-05 VITALS — SYSTOLIC BLOOD PRESSURE: 145 MMHG | DIASTOLIC BLOOD PRESSURE: 86 MMHG | TEMPERATURE: 99 F | HEART RATE: 67 BPM

## 2023-09-05 DIAGNOSIS — N42.32 ATYPICAL SMALL ACINAR PROLIFERATION OF PROSTATE: ICD-10-CM

## 2023-09-05 DIAGNOSIS — R97.20 ELEVATED PSA: ICD-10-CM

## 2023-09-05 DIAGNOSIS — N40.0 BPH WITHOUT OBSTRUCTION/LOWER URINARY TRACT SYMPTOMS: Primary | ICD-10-CM

## 2023-09-05 LAB
BILIRUB SERPL-MCNC: NEGATIVE MG/DL
BLOOD URINE, POC: NORMAL
CLARITY, POC UA: CLEAR
COLOR, POC UA: YELLOW
GLUCOSE UR QL STRIP: NEGATIVE
KETONES UR QL STRIP: NEGATIVE
LEUKOCYTE ESTERASE URINE, POC: NEGATIVE
NITRITE, POC UA: NEGATIVE
PH, POC UA: 5.5
PROTEIN, POC: NORMAL
SPECIFIC GRAVITY, POC UA: 1.03
UROBILINOGEN, POC UA: 0.2

## 2023-09-05 PROCEDURE — 81002 POCT URINE DIPSTICK WITHOUT MICROSCOPE: ICD-10-PCS | Mod: S$GLB,,, | Performed by: UROLOGY

## 2023-09-05 PROCEDURE — 99999 PR PBB SHADOW E&M-EST. PATIENT-LVL III: ICD-10-PCS | Mod: PBBFAC,,, | Performed by: UROLOGY

## 2023-09-05 PROCEDURE — 99214 OFFICE O/P EST MOD 30 MIN: CPT | Mod: S$GLB,,, | Performed by: UROLOGY

## 2023-09-05 PROCEDURE — 99999 PR PBB SHADOW E&M-EST. PATIENT-LVL III: CPT | Mod: PBBFAC,,, | Performed by: UROLOGY

## 2023-09-05 PROCEDURE — 99214 PR OFFICE/OUTPT VISIT, EST, LEVL IV, 30-39 MIN: ICD-10-PCS | Mod: S$GLB,,, | Performed by: UROLOGY

## 2023-09-05 PROCEDURE — 81002 URINALYSIS NONAUTO W/O SCOPE: CPT | Mod: S$GLB,,, | Performed by: UROLOGY

## 2023-09-05 NOTE — PROGRESS NOTES
Chief Complaint:   Encounter Diagnoses   Name Primary?    BPH without obstruction/lower urinary tract symptoms Yes    Elevated PSA     Atypical small acinar proliferation of prostate        HPI:  HPI Rob Hickey Jr. arvin 62 y.o. male who presents with follow up to a prostate biopsy.  He had a normal PSA at 2.2.  However an MRI of his pelvis due to abnormal finding on a CT showed a large PI-RADS 5 lesion in his prostate.  Underwent a urinalysis prostate biopsy.  He would no problems after the biopsy.  He is had no difficulty voiding.  Volume was 42 cc.    History:  Social History     Tobacco Use    Smoking status: Never    Smokeless tobacco: Never   Substance Use Topics    Alcohol use: No     Alcohol/week: 0.0 standard drinks of alcohol    Drug use: No     Past Medical History:   Diagnosis Date    Anxiety disorder, unspecified     CAD in native artery 10/05/2020    Hypercholesterolemia 10/05/2020    Hypertension     Obesity (BMI 35.0-39.9 without comorbidity) 08/10/2020    SVT (supraventricular tachycardia) 05/04/2021     Past Surgical History:   Procedure Laterality Date    None       Family History   Problem Relation Age of Onset    No Known Problems Sister     Heart disease Brother 45        MI/CAD    Heart disease Sister 23        MI/CAD    Cancer Neg Hx     Diabetes Neg Hx     Stroke Neg Hx     Hypertension Neg Hx        Current Outpatient Medications on File Prior to Visit   Medication Sig Dispense Refill    blood sugar diagnostic Strp To check BG 1 times daily, to use with insurance preferred meter 50 strip 11    blood-glucose meter kit To check BG 1 times daily, to use with insurance preferred meter 1 each 0    fluticasone propionate (FLONASE) 50 mcg/actuation nasal spray 2 sprays (100 mcg total) by Each Nostril route once daily. 48 g 1    lancets Misc To check BG 1 times daily, to use with insurance preferred meter 50 each 11    montelukast (SINGULAIR) 10 mg tablet TAKE 1 TABLET BY MOUTH EVERY DAY IN THE  EVENING 90 tablet 1    rosuvastatin (CRESTOR) 5 MG tablet TAKE 1 TABLET BY MOUTH EVERY DAY IN THE EVENING 90 tablet 3    metoprolol succinate (TOPROL-XL) 50 MG 24 hr tablet Take 1 tablet (50 mg total) by mouth once daily. 90 tablet 4    sertraline (ZOLOFT) 50 MG tablet Take 1 tablet (50 mg total) by mouth once daily. 30 tablet 5     No current facility-administered medications on file prior to visit.        Objective:     Vitals:    09/05/23 1014   BP: (!) 145/86   BP Location: Left arm   Patient Position: Sitting   Pulse: 67   Temp: 98.5 °F (36.9 °C)   TempSrc: Oral      BMI Readings from Last 1 Encounters:   08/24/23 37.04 kg/m²          Physical Exam  No acute distress alert and oriented   Respirations even unlabored   Abdomen is obese nontender    Lab Results   Component Value Date    PSA 2.2 08/03/2023    PSA 1.5 08/04/2022    PSA 0.93 09/08/2020        Lab Results   Component Value Date    CREATININE 0.9 08/08/2023      Assessment:       1. BPH without obstruction/lower urinary tract symptoms    2. Elevated PSA    3. Atypical small acinar proliferation of prostate        Plan:     1. BPH without obstruction/lower urinary tract symptoms    2. Elevated PSA    3. Atypical small acinar proliferation of prostate       Orders Placed This Encounter    PROSTATE SPECIFIC ANTIGEN, DIAGNOSTIC    POCT URINE DIPSTICK WITHOUT MICROSCOPE      Discuss findings of the biopsy showing atypical small-cell proliferation. Recommend close follow up of his PSA.  Repeat in 4 months.  We will re-evaluate upon returned.  He does have a history of urolithiasis with no residual stones on his last scan.  He does have a large simple renal cysts.

## 2023-09-21 ENCOUNTER — CLINICAL SUPPORT (OUTPATIENT)
Dept: DIABETES | Facility: CLINIC | Age: 63
End: 2023-09-21
Payer: COMMERCIAL

## 2023-09-21 DIAGNOSIS — E11.9 NEW ONSET TYPE 2 DIABETES MELLITUS: Primary | ICD-10-CM

## 2023-09-21 PROCEDURE — G0108 DIAB MANAGE TRN  PER INDIV: HCPCS | Mod: 95,,, | Performed by: DIETITIAN, REGISTERED

## 2023-09-21 PROCEDURE — G0108 PR DIAB MANAGE TRN  PER INDIV: ICD-10-PCS | Mod: 95,,, | Performed by: DIETITIAN, REGISTERED

## 2023-11-09 ENCOUNTER — TELEPHONE (OUTPATIENT)
Dept: DIABETES | Facility: CLINIC | Age: 63
End: 2023-11-09
Payer: COMMERCIAL

## 2023-11-29 DIAGNOSIS — J30.2 SEASONAL ALLERGIC RHINITIS, UNSPECIFIED TRIGGER: ICD-10-CM

## 2023-11-29 RX ORDER — FLUTICASONE PROPIONATE 50 MCG
2 SPRAY, SUSPENSION (ML) NASAL
Qty: 48 ML | Refills: 1 | Status: SHIPPED | OUTPATIENT
Start: 2023-11-29

## 2023-11-29 NOTE — TELEPHONE ENCOUNTER
No care due was identified.  [facility] Embedded Care Due Messages. Reference number: [id number].   11/29/2023 1:14:54 AM CST

## 2023-11-30 NOTE — TELEPHONE ENCOUNTER
Refill Decision Note   Rob Hickey  is requesting a refill authorization.  Brief Assessment and Rationale for Refill:  Approve     Medication Therapy Plan:         Comments:     Note composed:11:12 PM 11/29/2023

## 2024-01-03 ENCOUNTER — LAB VISIT (OUTPATIENT)
Dept: LAB | Facility: HOSPITAL | Age: 64
End: 2024-01-03
Attending: UROLOGY
Payer: COMMERCIAL

## 2024-01-03 DIAGNOSIS — N40.0 BPH WITHOUT OBSTRUCTION/LOWER URINARY TRACT SYMPTOMS: ICD-10-CM

## 2024-01-03 DIAGNOSIS — R97.20 ELEVATED PSA: ICD-10-CM

## 2024-01-03 LAB — COMPLEXED PSA SERPL-MCNC: 1.2 NG/ML (ref 0–4)

## 2024-01-03 PROCEDURE — 36415 COLL VENOUS BLD VENIPUNCTURE: CPT | Performed by: UROLOGY

## 2024-01-03 PROCEDURE — 84153 ASSAY OF PSA TOTAL: CPT | Performed by: UROLOGY

## 2024-01-05 ENCOUNTER — OFFICE VISIT (OUTPATIENT)
Dept: UROLOGY | Facility: CLINIC | Age: 64
End: 2024-01-05
Payer: COMMERCIAL

## 2024-01-05 VITALS
SYSTOLIC BLOOD PRESSURE: 148 MMHG | WEIGHT: 252 LBS | RESPIRATION RATE: 18 BRPM | TEMPERATURE: 98 F | BODY MASS INDEX: 36.16 KG/M2 | DIASTOLIC BLOOD PRESSURE: 81 MMHG | HEART RATE: 70 BPM

## 2024-01-05 DIAGNOSIS — N40.0 BPH WITHOUT OBSTRUCTION/LOWER URINARY TRACT SYMPTOMS: ICD-10-CM

## 2024-01-05 DIAGNOSIS — R31.29 MICROHEMATURIA: ICD-10-CM

## 2024-01-05 DIAGNOSIS — N42.32 ATYPICAL SMALL ACINAR PROLIFERATION OF PROSTATE: Primary | ICD-10-CM

## 2024-01-05 PROCEDURE — 99214 OFFICE O/P EST MOD 30 MIN: CPT | Mod: S$GLB,,, | Performed by: UROLOGY

## 2024-01-05 PROCEDURE — 99999 PR PBB SHADOW E&M-EST. PATIENT-LVL IV: CPT | Mod: PBBFAC,,, | Performed by: UROLOGY

## 2024-01-05 NOTE — PROGRESS NOTES
Addended by: MILEY JONES on: 11/2/2017 03:17 PM     Modules accepted: Orders     Chief Complaint:   Encounter Diagnoses   Name Primary?    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 arvin 63 y.o. male who presents Here for follow-up of incidentally discovered PI-RADS 5 lesion in his prostate.  He underwent a Uronav biopsy.  This only showed small-cell proliferation.  He returns today for follow up his PSA has decreased by 50%.  He has not having any lower urinary tract symptoms.  He also underwent a cystoscopy at that time due to possible bladder polyp.  This was essentially normal other than BPH.  History:  Social History     Tobacco Use    Smoking status: Never    Smokeless tobacco: Never   Substance Use Topics    Alcohol use: No     Alcohol/week: 0.0 standard drinks of alcohol    Drug use: No     Past Medical History:   Diagnosis Date    Anxiety disorder, unspecified     CAD in native artery 10/05/2020    Hypercholesterolemia 10/05/2020    Hypertension     Obesity (BMI 35.0-39.9 without comorbidity) 08/10/2020    SVT (supraventricular tachycardia) 05/04/2021     Past Surgical History:   Procedure Laterality Date    None       Family History   Problem Relation Age of Onset    No Known Problems Sister     Heart disease Brother 45        MI/CAD    Heart disease Sister 23        MI/CAD    Cancer Neg Hx     Diabetes Neg Hx     Stroke Neg Hx     Hypertension Neg Hx        Current Outpatient Medications on File Prior to Visit   Medication Sig Dispense Refill    blood sugar diagnostic Strp To check BG 1 times daily, to use with insurance preferred meter 50 strip 11    blood-glucose meter kit To check BG 1 times daily, to use with insurance preferred meter 1 each 0    fluticasone propionate (FLONASE) 50 mcg/actuation nasal spray SPRAY 2 SPRAYS BY EACH NOSTRIL ROUTE ONCE DAILY. 48 mL 1    lancets Misc To check BG 1 times daily, to use with insurance preferred meter 50 each 11     montelukast (SINGULAIR) 10 mg tablet TAKE 1 TABLET BY MOUTH EVERY DAY IN THE EVENING 90 tablet 1    rosuvastatin (CRESTOR) 5 MG tablet TAKE 1 TABLET BY MOUTH EVERY DAY IN THE EVENING 90 tablet 3    metoprolol succinate (TOPROL-XL) 50 MG 24 hr tablet Take 1 tablet (50 mg total) by mouth once daily. 90 tablet 4    sertraline (ZOLOFT) 50 MG tablet Take 1 tablet (50 mg total) by mouth once daily. 30 tablet 5     No current facility-administered medications on file prior to visit.        Objective:     Vitals:    01/05/24 1024   BP: (!) 148/81   Pulse: 70   Resp: 18   Temp: 98 °F (36.7 °C)   Weight: 114.3 kg (251 lb 15.8 oz)      BMI Readings from Last 1 Encounters:   01/05/24 36.16 kg/m²          Physical Exam  Distress alert and oriented  Respirations even unlabored   Abdomen is soft nontender    Lab Results   Component Value Date    PSADIAG 1.2 01/03/2024    PSA 2.2 08/03/2023    PSA 1.5 08/04/2022    PSA 0.93 09/08/2020        Lab Results   Component Value Date    CREATININE 0.9 08/08/2023      Assessment:       1. Atypical small acinar proliferation of prostate    2. BPH without obstruction/lower urinary tract symptoms    3. Microhematuria        Plan:     1. Atypical small acinar proliferation of prostate    2. BPH without obstruction/lower urinary tract symptoms    3. Microhematuria       Orders Placed This Encounter    Prostate Specific Antigen, Diagnostic      History of negative prostate biopsy.  Atypical small acinar proliferation.  PSA normalized.  Microhematuria negative evaluation.  BPH on observation with no symptoms.  Recommend follow up in 6 months.  If his PSA stable we will space to annual follow up.

## 2024-05-22 DIAGNOSIS — J30.2 SEASONAL ALLERGIC RHINITIS, UNSPECIFIED TRIGGER: ICD-10-CM

## 2024-05-22 RX ORDER — FLUTICASONE PROPIONATE 50 MCG
SPRAY, SUSPENSION (ML) NASAL
Qty: 48 ML | Refills: 0 | Status: SHIPPED | OUTPATIENT
Start: 2024-05-22

## 2024-05-22 NOTE — TELEPHONE ENCOUNTER
Refill Decision Note   Rob Hickey  is requesting a refill authorization.  Brief Assessment and Rationale for Refill:  Approve     Medication Therapy Plan:         Comments:     Note composed:8:36 AM 05/22/2024

## 2024-05-22 NOTE — TELEPHONE ENCOUNTER
No care due was identified.  [facility] Embedded Care Due Messages. Reference number: [id number].   5/22/2024 12:15:23 AM CDT

## 2024-07-05 ENCOUNTER — LAB VISIT (OUTPATIENT)
Dept: LAB | Facility: HOSPITAL | Age: 64
End: 2024-07-05
Attending: UROLOGY
Payer: COMMERCIAL

## 2024-07-05 DIAGNOSIS — N42.32 ATYPICAL SMALL ACINAR PROLIFERATION OF PROSTATE: ICD-10-CM

## 2024-07-05 LAB — COMPLEXED PSA SERPL-MCNC: 1.3 NG/ML (ref 0–4)

## 2024-07-05 PROCEDURE — 84153 ASSAY OF PSA TOTAL: CPT | Performed by: UROLOGY

## 2024-07-05 PROCEDURE — 36415 COLL VENOUS BLD VENIPUNCTURE: CPT | Performed by: UROLOGY

## 2024-07-12 ENCOUNTER — OFFICE VISIT (OUTPATIENT)
Dept: UROLOGY | Facility: CLINIC | Age: 64
End: 2024-07-12
Payer: COMMERCIAL

## 2024-07-12 VITALS
DIASTOLIC BLOOD PRESSURE: 86 MMHG | BODY MASS INDEX: 35.54 KG/M2 | HEIGHT: 70 IN | HEART RATE: 72 BPM | RESPIRATION RATE: 12 BRPM | WEIGHT: 248.25 LBS | SYSTOLIC BLOOD PRESSURE: 144 MMHG

## 2024-07-12 DIAGNOSIS — N40.0 BPH WITHOUT OBSTRUCTION/LOWER URINARY TRACT SYMPTOMS: Primary | ICD-10-CM

## 2024-07-12 DIAGNOSIS — N42.32 ATYPICAL SMALL ACINAR PROLIFERATION OF PROSTATE: ICD-10-CM

## 2024-07-12 PROCEDURE — 99999 PR PBB SHADOW E&M-EST. PATIENT-LVL III: CPT | Mod: PBBFAC,,, | Performed by: UROLOGY

## 2024-07-12 NOTE — PROGRESS NOTES
Chief Complaint:   Encounter Diagnoses   Name Primary?    BPH without obstruction/lower urinary tract symptoms Yes    Atypical small acinar proliferation of prostate        HPI:  HPI Rob Hickey Jr. arvin 63 y.o. male who presents with follow up.  He has a history of incidentally discovered abnormality on a pelvic CT.  Further evaluation with MRI revealed no evidence of the cystic abnormality previously seen.  However there was not incidentally discovered PI-RADS 5 lesion.  His PSA was normal along with his prostate exam.  He underwent a uronav biopsy which showed atypical small proliferation.  His PSA continued to be stable.  Decision was made to just observe.  He did have microhematuria and had a full workup for that as well.  He does have BPH which is on observation.  He comes back today his AUA symptom score is 1.  He has nocturia 1 times a night.  He was not having any interval health changes.  He was considering retiring later this year.    History:  Social History     Tobacco Use    Smoking status: Never    Smokeless tobacco: Never   Substance Use Topics    Alcohol use: No     Alcohol/week: 0.0 standard drinks of alcohol    Drug use: No     Past Medical History:   Diagnosis Date    Anxiety disorder, unspecified     CAD in native artery 10/05/2020    Hypercholesterolemia 10/05/2020    Hypertension     Obesity (BMI 35.0-39.9 without comorbidity) 08/10/2020    SVT (supraventricular tachycardia) 05/04/2021     Past Surgical History:   Procedure Laterality Date    None       Family History   Problem Relation Name Age of Onset    No Known Problems Sister      Heart disease Brother  45        MI/CAD    Heart disease Sister  23        MI/CAD    Cancer Neg Hx      Diabetes Neg Hx      Stroke Neg Hx      Hypertension Neg Hx         Current Outpatient Medications on File Prior to Visit   Medication Sig Dispense Refill    blood sugar diagnostic Strp To check BG 1 times daily, to use with insurance preferred meter 50  "strip 11    blood-glucose meter kit To check BG 1 times daily, to use with insurance preferred meter 1 each 0    fluticasone propionate (FLONASE) 50 mcg/actuation nasal spray SPRAY 2 SPRAYS INTO EACH NOSTRIL ONCE DAILY 48 mL 0    lancets Misc To check BG 1 times daily, to use with insurance preferred meter 50 each 11    metoprolol succinate (TOPROL-XL) 50 MG 24 hr tablet Take 1 tablet (50 mg total) by mouth once daily. 90 tablet 4    montelukast (SINGULAIR) 10 mg tablet TAKE 1 TABLET BY MOUTH EVERY DAY IN THE EVENING 90 tablet 1    rosuvastatin (CRESTOR) 5 MG tablet TAKE 1 TABLET BY MOUTH EVERY DAY IN THE EVENING 90 tablet 3    sertraline (ZOLOFT) 50 MG tablet Take 1 tablet (50 mg total) by mouth once daily. 30 tablet 5     No current facility-administered medications on file prior to visit.        Objective:     Vitals:    07/12/24 0920   BP: (!) 144/86   Pulse: 72   Resp: 12   Weight: 112.6 kg (248 lb 3.8 oz)   Height: 5' 10" (1.778 m)      BMI Readings from Last 1 Encounters:   07/12/24 35.62 kg/m²          Physical Exam  No acute distress alert and oriented   Respirations even unlabored   Abdomen is soft nontender   Digital rectal exam reveals a 40 g prostate smooth no nodules  Lab Results   Component Value Date    PSADIAG 1.3 07/05/2024    PSADIAG 1.2 01/03/2024    PSA 2.2 08/03/2023    PSA 1.5 08/04/2022    PSA 0.93 09/08/2020        Lab Results   Component Value Date    CREATININE 0.9 08/08/2023      Assessment:       1. BPH without obstruction/lower urinary tract symptoms    2. Atypical small acinar proliferation of prostate        Plan:     1. BPH without obstruction/lower urinary tract symptoms    2. Atypical small acinar proliferation of prostate       Orders Placed This Encounter    POCT Bladder Scan      Urologically stable.  Recommend annual PSA CHRIS and follow up.  He understands if he develops any interval problems he is to come in to see me earlier.  "

## 2024-08-05 ENCOUNTER — HOSPITAL ENCOUNTER (OUTPATIENT)
Dept: RADIOLOGY | Facility: HOSPITAL | Age: 64
Discharge: HOME | End: 2024-08-05
Attending: FAMILY MEDICINE
Payer: COMMERCIAL

## 2024-08-05 ENCOUNTER — OFFICE VISIT (OUTPATIENT)
Dept: FAMILY MEDICINE | Facility: CLINIC | Age: 64
End: 2024-08-05
Attending: FAMILY MEDICINE
Payer: COMMERCIAL

## 2024-08-05 VITALS
HEART RATE: 63 BPM | BODY MASS INDEX: 35.57 KG/M2 | TEMPERATURE: 99 F | HEIGHT: 70 IN | RESPIRATION RATE: 18 BRPM | DIASTOLIC BLOOD PRESSURE: 72 MMHG | OXYGEN SATURATION: 98 % | SYSTOLIC BLOOD PRESSURE: 138 MMHG | WEIGHT: 248.44 LBS

## 2024-08-05 DIAGNOSIS — Z00.00 ANNUAL PHYSICAL EXAM: ICD-10-CM

## 2024-08-05 DIAGNOSIS — M25.661 KNEE JOINT STIFFNESS, BILATERAL: ICD-10-CM

## 2024-08-05 DIAGNOSIS — E11.9 TYPE 2 DIABETES MELLITUS WITHOUT COMPLICATION, WITHOUT LONG-TERM CURRENT USE OF INSULIN: ICD-10-CM

## 2024-08-05 DIAGNOSIS — E66.01 SEVERE OBESITY (BMI 35.0-39.9) WITH COMORBIDITY: ICD-10-CM

## 2024-08-05 DIAGNOSIS — M25.662 KNEE JOINT STIFFNESS, BILATERAL: ICD-10-CM

## 2024-08-05 DIAGNOSIS — Z12.11 COLON CANCER SCREENING: ICD-10-CM

## 2024-08-05 DIAGNOSIS — I25.10 CAD IN NATIVE ARTERY: ICD-10-CM

## 2024-08-05 DIAGNOSIS — Z00.00 ANNUAL PHYSICAL EXAM: Primary | ICD-10-CM

## 2024-08-05 DIAGNOSIS — E78.00 HYPERCHOLESTEROLEMIA: ICD-10-CM

## 2024-08-05 DIAGNOSIS — I10 ESSENTIAL HYPERTENSION: ICD-10-CM

## 2024-08-05 LAB
ALBUMIN/CREAT UR: 16.3 UG/MG (ref 0–30)
CREAT UR-MCNC: 264 MG/DL (ref 23–375)
MICROALBUMIN UR DL<=1MG/L-MCNC: 43 UG/ML

## 2024-08-05 PROCEDURE — 73562 X-RAY EXAM OF KNEE 3: CPT | Mod: TC,50,FY,PO

## 2024-08-05 PROCEDURE — 82043 UR ALBUMIN QUANTITATIVE: CPT | Performed by: FAMILY MEDICINE

## 2024-08-05 PROCEDURE — 99999 PR PBB SHADOW E&M-EST. PATIENT-LVL V: CPT | Mod: PBBFAC,,, | Performed by: FAMILY MEDICINE

## 2024-08-05 PROCEDURE — 99396 PREV VISIT EST AGE 40-64: CPT | Mod: S$GLB,,, | Performed by: FAMILY MEDICINE

## 2024-08-05 PROCEDURE — 82570 ASSAY OF URINE CREATININE: CPT | Performed by: FAMILY MEDICINE

## 2024-08-05 PROCEDURE — 73562 X-RAY EXAM OF KNEE 3: CPT | Mod: 26,50,, | Performed by: RADIOLOGY

## 2024-08-05 NOTE — PROGRESS NOTES
HISTORY OF PRESENT ILLNESS: Mr. Hickey comes in today for annual wellness examination.  He is fasting and has not taken medications today. He reports no acute problems today.     END OF LIFE DECISION: He does not have a living will. He does desire life support.    Current Outpatient Medications   Medication Sig    blood sugar diagnostic Strp To check BG 1 times daily, to use with insurance preferred meter    blood-glucose meter kit To check BG 1 times daily, to use with insurance preferred meter    fluticasone propionate (FLONASE) 50 mcg/actuation nasal spray SPRAY 2 SPRAYS INTO EACH NOSTRIL ONCE DAILY    lancets Misc To check BG 1 times daily, to use with insurance preferred meter    metoprolol succinate (TOPROL-XL) 50 MG 24 hr tablet Take 1 tablet (50 mg total) by mouth once daily.    montelukast (SINGULAIR) 10 mg tablet TAKE 1 TABLET BY MOUTH EVERY DAY IN THE EVENING    rosuvastatin (CRESTOR) 5 MG tablet TAKE 1 TABLET BY MOUTH EVERY DAY IN THE EVENING      SCREENINGS:    Cholesterol w/CMPl: August 8, 2023.  Colonoscopy: Never. He desires.   Prostate Exam/PSA: July 5, 2024 - 1.3.   [facility] Exam: July [facility] per patient. He glasses.  Dental Exam: Years ago per patient.  PPD: Negative the past.     Immunizations:    Shingrix: Never.  Reports no history of varicella or zoster.  Tdap: December 10, 2019.  Flu shot: January 9, 2024.  RSV: Never. Advised available at local pharmacy.  Covid-19 (Pfizer) vaccine series - March 21, 2021, April 11, 2021, January 29, 2022, January 9, 2024.        ROS:  GENERAL: No fever, chills, fatigue or unusual weight change. Appetite normal. Exercises not leisurely but moves around daily at job. Monitors diet does. Weight  119.5 kg (263 lb 7.2 oz) at August 4, 2023 visit.   SKIN: No rashes, itching, changes in mole, color or texture of skin or easy bruising.  HEAD: No headaches or recent head trauma.  EYES: No change in vision,   no pain, diplopia, redness or discharge.  EARS: Denies  "ear pain, discharge, vertigo or decreased hearing.  NOSE: No epistaxis or rhinitis. Nontender external nose.  MOUTH & THROAT: No hoarseness or change in voice. No excessive gum bleeding or mouth sores.  No sore throat.  NODES: Denies swollen glands.  CHEST: Denies KENNEY, wheezing, cough, hemoptysis or sputum production.  CARDIOVASCULAR: Denies chest pain, palpitations. Saw Dr. Grajeda, cardiologist, on January 11, 2022 for CAD, SVT, HTN, HLD surveillance with 6-month follow up advised. Perform home blood pressure checks weekly using arm monitor with levels ranging 140-150'/70's.  ABDOMEN: Denies diarrhea, constipation, vomiting, abdominal pain, or blood in stool.  GENITOURINARY: No flank pain, dysuria or hematuria.No nocturia or frequency. No lesions, pain or swelling in genital area. Performs monthly self testicular exam does. Saw Dr. SADIQ Willis on July 12, 2024 for BPH without obstruction/lower urinary tract symptoms, atypical small acinar proliferation of prostate with 1-year follow up.   ENDOCRINE: Denies thyroid  problems. Does not perform home glucose check.  HEME/LYMPH: Denies bleeding problems.  PERIPHERAL VASCULAR: No claudication or cyanosis  MUSCULOSKELETAL: No joint pain or swelling. No edema. But reports chronic, occasional knee(s) stiffness without pain and desires x-rays.  NEUROLOGIC: No history of seizures, tremors, alteration of gait or coordination.  PSYCHIATRIC: Denies mood swings, depression, anxiety, homicidal or suicidal thoughts. Denies sleep problems.     PE:   VS:  /72   Pulse 63   Temp 98.6 °F (37 °C) (Tympanic)   Resp 18   Ht 5' 10" (1.778 m)   Wt 112.7 kg (248 lb 7.3 oz)   SpO2 98%   BMI 35.65 kg/m²   APPEARANCE:  Well nourished, well developed male, pleasant and obese, alert and oriented in no acute distress.    HEAD: Non tender . Full range of motion.  EYES: PERRL, conjunctiva pink, lids no edema. He wears glasses.  EARS: External canal patent, no swelling or redness. TM's shiny " and clear.  NOSE: Mucosa and turbinates pink, not swollen. No discharge  THROAT: No pharyngeal erythema or exudate. No stridor.    NECK: Supple, no mass, thyroid not enlarged. No carotid bruit.  NODES: No cervical, axillary inguinal lymph node enlargement.  CHEST: Normal respiratory effort. Lungs clear to auscultation.  CARDIOVASCULAR: Normal S1, S2. No rubs, murmurs or gallops.No edema.Pedal pulses palpable bilaterally.  ABDOMEN: Bowel sounds present. Not distended. Soft. No tenderness, masses or organomegaly.  BREAST:  Nontender, no asymmetry, nipple discharge, abnormal masses, nodules, lumps.  GENITALIA: Not examined per patient request.  RECTAL: Not examined per patient request.  MUSCULOSKELETAL: No joint deformities or stiffness. He is ambulatory without problems.  Crepitance with no tenderness or swelling but with full range of motion at both knees.  SKIN: No rashes or suspicious lesions, normal color and turgor.  NEUROLOGIC: Cranial Nerves: II-XII grossly intact.  DTR's: Knees, Ankles 2+ and equal bilaterally Gait & Posture: Normal gait and fine motion.  PSYCHIATRIC: Patient alert, oriented x 3. Mood/Affect normal without acute anxiety and depression noted.Judgement and insight-good as he makes appropriate decisions on today's examination.    Protective Sensation (w/ 10 gram monofilament):  Right: Intact  Left: Intact    Visual Inspection:  Normal -  Bilateral    Pedal Pulses:   Right: Present  Left: Present    Posterior Tibialis Pulses:   Right:Present  Left: Present     ASSESSMENT:    ICD-10-CM ICD-9-CM    1. Annual physical exam  Z00.00 V70.0 Microalbumin/Creatinine Ratio, Urine      Hemoglobin A1C      TSH      Comprehensive Metabolic Panel      Lipid Panel      CBC Auto Differential      X-ray Knee Ortho Bilateral      2. Essential hypertension  I10 401.9       3. Hypercholesterolemia  E78.00 272.0       4. CAD in native artery  I25.10 414.01 Ambulatory referral/consult to Cardiology      5. Type 2  diabetes mellitus without complication, without long-term current use of insulin  E11.9 250.00       6. Knee joint stiffness, bilateral  M25.661 719.56 X-ray Knee Ortho Bilateral    M25.662        7. Severe obesity (BMI 35.0-39.9) with comorbidity  E66.01 278.01       8. Colon cancer screening  Z12.11 V76.51 Ambulatory referral/consult to Endo Procedure         PLAN:   1. Age-appropriate counseling-appropriate low-sodium, low-cholesterol, low carbohydrate diet and exercise daily, monthly self testicular examination, annual wellness examination.  2. Patient advised to call for results.  3. Continue current medications.  4. Keep follow up with specialists.  5. Flu shot this fall.  6. Annual eye and dental examinations advised.  7. Follow up in about 6 months (around 2/6/2025) for diabetes and hypertension follow up.

## 2024-08-08 ENCOUNTER — HOSPITAL ENCOUNTER (OUTPATIENT)
Dept: PREADMISSION TESTING | Facility: HOSPITAL | Age: 64
Discharge: HOME | End: 2024-08-08
Attending: COLON & RECTAL SURGERY
Payer: COMMERCIAL

## 2024-08-08 DIAGNOSIS — Z12.11 COLON CANCER SCREENING: ICD-10-CM

## 2024-08-29 ENCOUNTER — PATIENT MESSAGE (OUTPATIENT)
Dept: FAMILY MEDICINE | Facility: CLINIC | Age: 64
End: 2024-08-29
Payer: COMMERCIAL

## 2024-08-29 DIAGNOSIS — E78.5 HYPERLIPIDEMIA LDL GOAL <70: Primary | ICD-10-CM

## 2024-08-29 DIAGNOSIS — I25.10 CAD IN NATIVE ARTERY: ICD-10-CM

## 2024-08-29 PROBLEM — E11.9 TYPE 2 DIABETES MELLITUS WITHOUT COMPLICATION, WITHOUT LONG-TERM CURRENT USE OF INSULIN: Status: ACTIVE | Noted: 2024-08-29

## 2024-08-29 RX ORDER — ROSUVASTATIN CALCIUM 10 MG/1
10 TABLET, COATED ORAL NIGHTLY
Qty: 90 TABLET | Refills: 3 | Status: SHIPPED | OUTPATIENT
Start: 2024-08-29 | End: 2025-08-29

## 2024-08-29 NOTE — TELEPHONE ENCOUNTER
I have put the following orders and/or medications to this note.      No orders of the defined types were placed in this encounter.      Medications Ordered This Encounter   Medications    rosuvastatin (CRESTOR) 10 MG tablet     Sig: Take 1 tablet (10 mg total) by mouth every evening.     Dispense:  90 tablet     Refill:  3

## 2024-09-19 DIAGNOSIS — I10 ESSENTIAL HYPERTENSION: ICD-10-CM

## 2024-09-19 RX ORDER — METOPROLOL SUCCINATE 50 MG/1
50 TABLET, EXTENDED RELEASE ORAL DAILY
Qty: 90 TABLET | Refills: 4 | OUTPATIENT
Start: 2024-09-19 | End: 2025-09-19

## 2024-09-19 NOTE — TELEPHONE ENCOUNTER
LVM to PT that he will need to schedule a apt for refill request for metoprolol.       Please see the attached refill request.

## 2024-09-27 ENCOUNTER — HOSPITAL ENCOUNTER (OUTPATIENT)
Dept: CARDIOLOGY | Facility: HOSPITAL | Age: 64
Discharge: HOME | End: 2024-09-27
Attending: INTERNAL MEDICINE
Payer: COMMERCIAL

## 2024-09-27 ENCOUNTER — OFFICE VISIT (OUTPATIENT)
Dept: CARDIOLOGY | Facility: CLINIC | Age: 64
End: 2024-09-27
Attending: FAMILY MEDICINE
Payer: COMMERCIAL

## 2024-09-27 VITALS
SYSTOLIC BLOOD PRESSURE: 136 MMHG | BODY MASS INDEX: 35.5 KG/M2 | RESPIRATION RATE: 16 BRPM | DIASTOLIC BLOOD PRESSURE: 80 MMHG | HEIGHT: 70 IN | OXYGEN SATURATION: 98 % | WEIGHT: 248 LBS | HEART RATE: 61 BPM

## 2024-09-27 DIAGNOSIS — I25.10 CAD IN NATIVE ARTERY: ICD-10-CM

## 2024-09-27 DIAGNOSIS — I48.0 PAROXYSMAL ATRIAL FIBRILLATION: Primary | ICD-10-CM

## 2024-09-27 DIAGNOSIS — I10 ESSENTIAL HYPERTENSION: Primary | ICD-10-CM

## 2024-09-27 DIAGNOSIS — I10 ESSENTIAL HYPERTENSION: ICD-10-CM

## 2024-09-27 DIAGNOSIS — E78.00 HYPERCHOLESTEROLEMIA: ICD-10-CM

## 2024-09-27 DIAGNOSIS — E66.9 OBESITY (BMI 35.0-39.9 WITHOUT COMORBIDITY): ICD-10-CM

## 2024-09-27 DIAGNOSIS — I47.10 SVT (SUPRAVENTRICULAR TACHYCARDIA): ICD-10-CM

## 2024-09-27 LAB
OHS QRS DURATION: 92 MS
OHS QTC CALCULATION: 453 MS

## 2024-09-27 PROCEDURE — 99999 PR PBB SHADOW E&M-EST. PATIENT-LVL IV: CPT | Mod: PBBFAC,,, | Performed by: INTERNAL MEDICINE

## 2024-09-27 PROCEDURE — 93005 ELECTROCARDIOGRAM TRACING: CPT

## 2024-09-27 PROCEDURE — 93010 ELECTROCARDIOGRAM REPORT: CPT | Mod: ,,, | Performed by: INTERNAL MEDICINE

## 2024-09-27 RX ORDER — METOPROLOL SUCCINATE 50 MG/1
50 TABLET, EXTENDED RELEASE ORAL DAILY
COMMUNITY

## 2024-09-27 NOTE — PROGRESS NOTES
Subjective:   Patient ID:  Rob Hickey Jr. is a 64 y.o. male who presents for follow-up of No chief complaint on file.    Last saw Jones 2022  Rob is a 64 year old with a past medical history of diabetes, hyperlipidemia, CAD and atrial fibrillation corrected by ablation 2 years ago, here for follow up regarding hypertension. He states that his BP has been controlled effectively and is usually in the 130-140s range systolic and 70-80s range diastolic. Overall he has been doing well. He does not require medication for his diabetes and follows up with his PCP regularly. He continues to take his rosuvastatin and his cholesterol levels on August 5 showed all levels to be in a healthy range. He has no other questions or concerns.     Coronary Artery Disease  Presents for follow-up visit. Pertinent negatives include no chest pain, leg swelling, palpitations, shortness of breath or weight gain. The symptoms have been stable. Compliance with diet is good. Compliance with exercise is good. Compliance with medications is good.     Hyperlipidemia  This is a chronic problem. The current episode started more than 1 year ago. The problem is controlled. Pertinent negatives include no chest pain or shortness of breath. Current antihyperlipidemic treatment includes statins. The current treatment provides moderate improvement of lipids. There are no compliance problems.   Review of Systems   Constitutional: Negative for chills, decreased appetite, diaphoresis, fever, malaise/fatigue, night sweats, weight gain and weight loss.   HENT:  Negative for congestion, ear pain, hearing loss and nosebleeds.    Eyes: Negative.  Negative for blurred vision, discharge, double vision, pain and photophobia.   Cardiovascular:  Negative for chest pain, claudication, dyspnea on exertion, irregular heartbeat, leg swelling, near-syncope, orthopnea, palpitations and syncope.   Respiratory:  Negative for hemoptysis, shortness of breath, sputum  production and wheezing.    Endocrine: Negative for polyphagia and polyuria.   Hematologic/Lymphatic: Negative for adenopathy and bleeding problem. Does not bruise/bleed easily.   Skin:  Negative for dry skin, itching and rash.   Musculoskeletal:  Negative for arthritis, back pain, falls, joint pain and muscle cramps.   Gastrointestinal:  Negative for abdominal pain, diarrhea, dysphagia, heartburn, nausea and vomiting.   Genitourinary:  Negative for bladder incontinence, dysuria and hematuria.   Neurological:  Negative for focal weakness, headaches, light-headedness, loss of balance, numbness, sensory change, tremors and vertigo.   Psychiatric/Behavioral:  Negative for depression, substance abuse, suicidal ideas and thoughts of violence.    Allergic/Immunologic: Negative for environmental allergies and persistent infections.   Family History   Problem Relation Name Age of Onset    No Known Problems Sister      Heart disease Brother  45        MI/CAD    Heart disease Sister  23        MI/CAD    Cancer Neg Hx      Diabetes Neg Hx      Stroke Neg Hx      Hypertension Neg Hx       Past Medical History:   Diagnosis Date    Anxiety disorder, unspecified     CAD in native artery 10/05/2020    Hypercholesterolemia 10/05/2020    Hypertension     Obesity (BMI 35.0-39.9 without comorbidity) 08/10/2020    SVT (supraventricular tachycardia) 05/04/2021    Type 2 diabetes mellitus without complication, without long-term current use of insulin 8/29/2024     Social History     Socioeconomic History    Marital status:     Number of children: 0   Occupational History    Occupation:      Comment: [employer]   Tobacco Use    Smoking status: Never    Smokeless tobacco: Never   Substance and Sexual Activity    Alcohol use: No     Alcohol/week: 0.0 standard drinks of alcohol    Drug use: No    Sexual activity: Yes   Social History Narrative    Served 4 years in [email or web address].  Has worked 40 years at [employer].     Social  Determinants of Health     Financial Resource Strain: Low Risk  (9/27/2024)    Overall Financial Resource Strain (CARDIA)     Difficulty of Paying Living Expenses: Not hard at all   Food Insecurity: No Food Insecurity (9/27/2024)    Hunger Vital Sign     Worried About Running Out of Food in the Last Year: Never true     Ran Out of Food in the Last Year: Never true   Physical Activity: Inactive (9/27/2024)    Exercise Vital Sign     Days of Exercise per Week: 0 days     Minutes of Exercise per Session: 0 min   Stress: No Stress Concern Present (9/27/2024)    [employer] - Occupational Stress Questionnaire     Feeling of Stress : Only a little   Housing Stability: Unknown (9/27/2024)    Housing Stability Vital Sign     Unable to Pay for Housing in the Last Year: No     Current Outpatient Medications on File Prior to Visit   Medication Sig Dispense Refill    blood sugar diagnostic Strp To check BG 1 times daily, to use with insurance preferred meter 50 strip 11    blood-glucose meter kit To check BG 1 times daily, to use with insurance preferred meter 1 each 0    fluticasone propionate (FLONASE) 50 mcg/actuation nasal spray SPRAY 2 SPRAYS INTO EACH NOSTRIL ONCE DAILY 48 mL 0    lancets Misc To check BG 1 times daily, to use with insurance preferred meter 50 each 11    metoprolol succinate (TOPROL-XL) 50 MG 24 hr tablet Take 50 mg by mouth once daily.      montelukast (SINGULAIR) 10 mg tablet TAKE 1 TABLET BY MOUTH EVERY DAY IN THE EVENING 90 tablet 1    rosuvastatin (CRESTOR) 10 MG tablet Take 1 tablet (10 mg total) by mouth every evening. 90 tablet 3     No current facility-administered medications on file prior to visit.     Review of patient's allergies indicates:  No Known Allergies    Objective:     Physical Exam  Constitutional:       Appearance: Normal appearance.   HENT:      Head: Normocephalic and atraumatic.      Right Ear: External ear normal.      Left Ear: External ear normal.       Nose: Nose normal.      Mouth/Throat:      Mouth: Mucous membranes are moist.      Pharynx: Oropharynx is clear.   Eyes:      Extraocular Movements: Extraocular movements intact.      Pupils: Pupils are equal, round, and reactive to light.   Cardiovascular:      Rate and Rhythm: Normal rate and regular rhythm.      Pulses: Normal pulses.      Heart sounds: Normal heart sounds.   Pulmonary:      Effort: Pulmonary effort is normal.      Breath sounds: Normal breath sounds.   Abdominal:      General: There is no distension.      Tenderness: There is no abdominal tenderness.   Musculoskeletal:         General: No swelling.      Cervical back: Normal range of motion.   Skin:     General: Skin is warm.      Capillary Refill: Capillary refill takes less than 2 seconds.      Coloration: Skin is not jaundiced.      Findings: No bruising or erythema.   Neurological:      Mental Status: He is alert and oriented to person, place, and time.      Sensory: No sensory deficit.   Psychiatric:         Mood and Affect: Mood normal.         Behavior: Behavior normal.     Assessment:     1. Paroxysmal atrial fibrillation    2. CAD in native artery    3. Essential hypertension    4. Hypercholesterolemia    5. SVT (supraventricular tachycardia)    6. Obesity (BMI 35.0-39.9 without comorbidity)    ? CAD    Plan:     Paroxysmal atrial fibrillation    CAD in native artery  -     Ambulatory referral/consult to Cardiology    Essential hypertension    Hypercholesterolemia    SVT (supraventricular tachycardia)    Obesity (BMI 35.0-39.9 without comorbidity)      Continue statin-HLP  BP diary  F/u Grajeda

## 2024-11-09 ENCOUNTER — OFFICE VISIT (OUTPATIENT)
Dept: URGENT CARE | Facility: CLINIC | Age: 64
End: 2024-11-09
Payer: COMMERCIAL

## 2024-11-09 VITALS
RESPIRATION RATE: 12 BRPM | HEART RATE: 68 BPM | HEIGHT: 70 IN | DIASTOLIC BLOOD PRESSURE: 84 MMHG | WEIGHT: 247.81 LBS | TEMPERATURE: 98 F | BODY MASS INDEX: 35.48 KG/M2 | SYSTOLIC BLOOD PRESSURE: 132 MMHG | OXYGEN SATURATION: 98 %

## 2024-11-09 DIAGNOSIS — J02.9 SORE THROAT: Primary | ICD-10-CM

## 2024-11-09 DIAGNOSIS — R05.9 COUGH, UNSPECIFIED TYPE: ICD-10-CM

## 2024-11-09 LAB
CTP QC/QA: YES
SARS-COV-2 AG RESP QL IA.RAPID: NEGATIVE

## 2024-11-09 PROCEDURE — 99213 OFFICE O/P EST LOW 20 MIN: CPT | Mod: S$GLB,,,

## 2024-11-09 PROCEDURE — 87811 SARS-COV-2 COVID19 W/OPTIC: CPT | Mod: QW,S$GLB,,

## 2024-11-09 NOTE — PROGRESS NOTES
"Subjective:      Patient ID: Rob Hickey Jr. is a 64 y.o. male.    Vitals:  height is 5' 10" (1.778 m) and weight is 112.4 kg (247 lb 12.8 oz). His oral temperature is 98.3 °F (36.8 °C). His blood pressure is 132/84 and his pulse is 68. His respiration is 12 and oxygen saturation is 98%.     Chief Complaint: Sore Throat    Rob Hickey Jr. is a 64 y.o. male who presents for sore throat which onset 5 days ago. Associated sxs include cough. Patient denies any fever, chills, SOB, CP, abd pain, n/v/d, rash, dizziness, or numbness/tingling. Prior Tx includes warm tea and tylenol. No known sick contacts.     Sore Throat   This is a new problem. The current episode started in the past 7 days (Onset Monday). The problem has been gradually improving. Neither side of throat is experiencing more pain than the other. There has been no fever. The pain is at a severity of 8/10. The pain is moderate. Associated symptoms include coughing (dry cough) and a hoarse voice. Pertinent negatives include no abdominal pain, congestion, diarrhea, ear discharge, ear pain, headaches, plugged ear sensation, neck pain, shortness of breath, swollen glands, trouble swallowing or vomiting. He has had no exposure to strep or mono. Treatments tried: warm tea. The treatment provided mild relief.       Constitution: Negative for appetite change, chills, sweating, fatigue and fever.   HENT:  Positive for sore throat. Negative for ear pain, ear discharge, foreign body in ear, hearing loss, congestion, postnasal drip, sinus pain, sinus pressure and trouble swallowing.    Neck: Negative for neck pain.   Cardiovascular:  Negative for chest pain.   Respiratory:  Positive for cough (dry cough). Negative for sputum production and shortness of breath.    Gastrointestinal:  Negative for abdominal pain, nausea, vomiting and diarrhea.   Musculoskeletal:  Negative for muscle ache.   Neurological:  Negative for dizziness, headaches, numbness and tingling.    "   Objective:     Physical Exam   Constitutional: He is oriented to person, place, and time. He appears well-developed. He is cooperative.  Non-toxic appearance. He does not appear ill. No distress.   HENT:   Head: Normocephalic and atraumatic.   Ears:   Right Ear: Hearing, tympanic membrane, external ear and ear canal normal.   Left Ear: Hearing, tympanic membrane, external ear and ear canal normal.   Nose: Nose normal. No mucosal edema or nasal deformity. No epistaxis. Right sinus exhibits no maxillary sinus tenderness and no frontal sinus tenderness. Left sinus exhibits no maxillary sinus tenderness and no frontal sinus tenderness.   Mouth/Throat: Uvula is midline and mucous membranes are normal. Mucous membranes are moist. No trismus in the jaw. Normal dentition. No uvula swelling. Posterior oropharyngeal erythema present. No oropharyngeal exudate or posterior oropharyngeal edema. Tonsils are 1+ on the right. Tonsils are 1+ on the left. No tonsillar exudate.   Eyes: Conjunctivae and lids are normal. No scleral icterus. Extraocular movement intact   Neck: Trachea normal and phonation normal. Neck supple. No edema present. No erythema present. No neck rigidity present.   Cardiovascular: Normal rate, regular rhythm, normal heart sounds and normal pulses.   Pulmonary/Chest: Effort normal and breath sounds normal. No stridor. No respiratory distress. He has no decreased breath sounds. He has no wheezes. He has no rhonchi. He has no rales.   Abdominal: Normal appearance.   Musculoskeletal: Normal range of motion.         General: No deformity. Normal range of motion.   Lymphadenopathy:     He has no cervical adenopathy.   Neurological: He is alert and oriented to person, place, and time. He exhibits normal muscle tone. Coordination normal.   Skin: Skin is warm, dry, intact, not diaphoretic and not pale.   Psychiatric: His speech is normal and behavior is normal. Judgment and thought content normal.   Nursing note and  vitals reviewed.      Assessment:     1. Sore throat    2. Cough, unspecified type        Results for orders placed or performed in visit on 11/09/24   SARS Coronavirus 2 Antigen, POCT Manual Read    Collection Time: 11/09/24 10:35 AM   Result Value Ref Range    SARS Coronavirus 2 Antigen Negative Negative     Acceptable Yes        Plan:       Sore throat    Cough, unspecified type  -     SARS Coronavirus 2 Antigen, POCT Manual Read      Afebrile. VSS. Patient is in NAD.  Discussed negative results with patient.  DDx: seasonal allergies/PND vs. Viral URI  Advised patient to begin Flonase and oral antihistamine for symptom relief.    Supportive care for sore throat (salt water gargles, lozenges, chloraseptic spray, cough drops, warm tea, honey, etc.).   Increase fluid intake and plenty of rest.  Tylenol/Ibuprofen (as permitted) as needed for any pain or discomfort.  If symptoms do not resolve, return to clinic for further evaluation.  ER precautions given such as SOB, CP, or fever not resolved with fever-reducing medications.

## 2025-02-07 ENCOUNTER — LAB VISIT (OUTPATIENT)
Dept: LAB | Facility: HOSPITAL | Age: 65
End: 2025-02-07
Attending: FAMILY MEDICINE
Payer: COMMERCIAL

## 2025-02-07 ENCOUNTER — OFFICE VISIT (OUTPATIENT)
Dept: FAMILY MEDICINE | Facility: CLINIC | Age: 65
End: 2025-02-07
Attending: FAMILY MEDICINE
Payer: COMMERCIAL

## 2025-02-07 VITALS
WEIGHT: 254.44 LBS | SYSTOLIC BLOOD PRESSURE: 132 MMHG | DIASTOLIC BLOOD PRESSURE: 84 MMHG | BODY MASS INDEX: 36.43 KG/M2 | HEART RATE: 81 BPM | TEMPERATURE: 99 F | OXYGEN SATURATION: 96 % | HEIGHT: 70 IN

## 2025-02-07 DIAGNOSIS — Z12.11 COLON CANCER SCREENING: ICD-10-CM

## 2025-02-07 DIAGNOSIS — Z23 NEED FOR PROPHYLACTIC VACCINATION AGAINST STREPTOCOCCUS PNEUMONIAE (PNEUMOCOCCUS): ICD-10-CM

## 2025-02-07 DIAGNOSIS — Z23 NEED FOR VACCINATION: ICD-10-CM

## 2025-02-07 DIAGNOSIS — I10 ESSENTIAL HYPERTENSION: Primary | ICD-10-CM

## 2025-02-07 DIAGNOSIS — E11.9 TYPE 2 DIABETES MELLITUS WITHOUT COMPLICATION, WITHOUT LONG-TERM CURRENT USE OF INSULIN: ICD-10-CM

## 2025-02-07 DIAGNOSIS — I25.10 CAD IN NATIVE ARTERY: ICD-10-CM

## 2025-02-07 DIAGNOSIS — E66.01 SEVERE OBESITY (BMI 35.0-39.9) WITH COMORBIDITY: ICD-10-CM

## 2025-02-07 LAB
ESTIMATED AVG GLUCOSE: 157 MG/DL (ref 68–131)
HBA1C MFR BLD: 7.1 % (ref 4–5.6)

## 2025-02-07 PROCEDURE — 1160F RVW MEDS BY RX/DR IN RCRD: CPT | Mod: CPTII,S$GLB,, | Performed by: FAMILY MEDICINE

## 2025-02-07 PROCEDURE — 90472 IMMUNIZATION ADMIN EACH ADD: CPT | Mod: S$GLB,,, | Performed by: FAMILY MEDICINE

## 2025-02-07 PROCEDURE — G2211 COMPLEX E/M VISIT ADD ON: HCPCS | Mod: S$GLB,,, | Performed by: FAMILY MEDICINE

## 2025-02-07 PROCEDURE — 3079F DIAST BP 80-89 MM HG: CPT | Mod: CPTII,S$GLB,, | Performed by: FAMILY MEDICINE

## 2025-02-07 PROCEDURE — 90656 IIV3 VACC NO PRSV 0.5 ML IM: CPT | Mod: S$GLB,,, | Performed by: FAMILY MEDICINE

## 2025-02-07 PROCEDURE — 90677 PCV20 VACCINE IM: CPT | Mod: S$GLB,,, | Performed by: FAMILY MEDICINE

## 2025-02-07 PROCEDURE — 99999 PR PBB SHADOW E&M-EST. PATIENT-LVL IV: CPT | Mod: PBBFAC,,, | Performed by: FAMILY MEDICINE

## 2025-02-07 PROCEDURE — 99214 OFFICE O/P EST MOD 30 MIN: CPT | Mod: 25,S$GLB,, | Performed by: FAMILY MEDICINE

## 2025-02-07 PROCEDURE — 3075F SYST BP GE 130 - 139MM HG: CPT | Mod: CPTII,S$GLB,, | Performed by: FAMILY MEDICINE

## 2025-02-07 PROCEDURE — 83036 HEMOGLOBIN GLYCOSYLATED A1C: CPT | Performed by: FAMILY MEDICINE

## 2025-02-07 PROCEDURE — 36415 COLL VENOUS BLD VENIPUNCTURE: CPT | Mod: PO | Performed by: FAMILY MEDICINE

## 2025-02-07 PROCEDURE — 3008F BODY MASS INDEX DOCD: CPT | Mod: CPTII,S$GLB,, | Performed by: FAMILY MEDICINE

## 2025-02-07 PROCEDURE — 1159F MED LIST DOCD IN RCRD: CPT | Mod: CPTII,S$GLB,, | Performed by: FAMILY MEDICINE

## 2025-02-07 PROCEDURE — 90471 IMMUNIZATION ADMIN: CPT | Mod: S$GLB,,, | Performed by: FAMILY MEDICINE

## 2025-02-07 NOTE — PROGRESS NOTES
Rob Hickey .    Chief Complaint   Patient presents with    Hypertension    Diabetes    Follow-up       History of Present Illness:   Mr. Hickey comes in today for hypertension and diabetes follow-up.  He states he is fasting and has not taken medications today.  He states he monitors his diet but does not exercise.  He states he occasionally performs home blood pressure and glucose checks with blood pressure levels ranging 140/80's and fasting glucose check on yesterday of 117.  He states he controls blood pressure and diabetes with lifestyle changes.  He states he continues to take Crestor 10 mg nightly for high cholesterol.    He reports no acute problems today. He denies having fever, chills, fatigue, appetite changes; shortness of breath, cough, wheezing; chest pain, palpitations, leg swelling; abdominal pain, nausea, vomiting, diarrhea, constipation; unusual urinary symptoms; polydipsia, polyphagia, polyuria, hot or cold intolerance; back pain; acute visual changes, numbness, headache; anxiety, depression, homicidal or suicidal thoughts.     He saw Dr. Hsu, cardiologist, on September 27, 2024 for paroxysmal atrial fibrillation, CAD in native artery, essential hypertension, hypercholesterolemia, SVT (supraventricular tachycardia), obesity (BMI 35.0-39.9 without comorbidity) at which time he states he was told he no longer needs Toprol XL with 1-year follow advised and scheduled for October 6, 2025.  He saw Dr. Willis, urologist, on July 2024 for BPH without obstruction/lower urinary tract symptoms, atypical small acinar proliferation of prostate and 1-year follow up scheduled for July 14, 2025.          Labs:                   WBC                      6.85                08/05/2024                 HGB                      13.8 (L)            08/05/2024                 HCT                      44.9                08/05/2024                 PLT                      230                 08/05/2024                  CHOL                     168                 08/05/2024                 TRIG                     58                  08/05/2024                 HDL                      45                  08/05/2024                 ALT                      18                  08/05/2024                 AST                      14                  08/05/2024                 NA                       141                 08/05/2024                 K                        4.1                 08/05/2024                 CL                       109                 08/05/2024                 CREATININE               1.0                 08/05/2024                 BUN                      10                  08/05/2024                 CO2                      24                  08/05/2024                 TSH                      1.508               08/05/2024                 PSA                      2.2                 08/03/2023                 INR                      1.0                 06/21/2021                 HGBA1C                   6.3 (H)             08/05/2024            LDLCALC                  111.4               08/05/2024                  Current Outpatient Medications   Medication Sig    blood sugar diagnostic Strp To check BG 1 times daily, to use with insurance preferred meter    blood-glucose meter kit To check BG 1 times daily, to use with insurance preferred meter    fluticasone propionate (FLONASE) 50 mcg/actuation nasal spray SPRAY 2 SPRAYS INTO EACH NOSTRIL ONCE DAILY    lancets Misc To check BG 1 times daily, to use with insurance preferred meter    montelukast (SINGULAIR) 10 mg tablet TAKE 1 TABLET BY MOUTH EVERY DAY IN THE EVENING    rosuvastatin (CRESTOR) 10 MG tablet Take 1 tablet (10 mg total) by mouth every evening.       Review of Systems   Constitutional:  Negative for activity change, appetite change, chills, fatigue and fever.        Weught 119.5 kg (263 lb 7.2 oz) at August 2024 visit.   Eyes:   Negative for visual disturbance.   Respiratory:  Negative for cough, shortness of breath and wheezing.    Cardiovascular:  Negative for chest pain, palpitations and leg swelling.        See history of present illness.   Gastrointestinal:  Negative for abdominal pain, constipation, diarrhea, nausea and vomiting.   Endocrine: Negative for cold intolerance, heat intolerance, polydipsia, polyphagia and polyuria.        See history of present illness.   Genitourinary:  Negative for difficulty urinating.        See history of present illness.   Musculoskeletal:  Negative for back pain.   Neurological:  Negative for numbness and headaches.   Psychiatric/Behavioral:  Negative for dysphoric mood and suicidal ideas. The patient is not nervous/anxious.         Negative for homicidal ideas.       Objective:  Physical Exam  Vitals reviewed.   Constitutional:       General: He is not in acute distress.     Appearance: Normal appearance. He is obese. He is not ill-appearing, toxic-appearing or diaphoretic.      Comments: Pleasant.   Cardiovascular:      Rate and Rhythm: Normal rate and regular rhythm.      Pulses:           Dorsalis pedis pulses are 3+ on the right side and 3+ on the left side.      Heart sounds: No murmur heard.  Pulmonary:      Effort: Pulmonary effort is normal. No respiratory distress.      Breath sounds: Normal breath sounds. No wheezing.   Abdominal:      General: Bowel sounds are normal. There is no distension.      Palpations: Abdomen is soft. There is no mass.      Tenderness: There is no abdominal tenderness. There is no guarding or rebound.   Musculoskeletal:         General: No swelling or tenderness. Normal range of motion.      Cervical back: Normal range of motion and neck supple. No tenderness.      Comments: He is ambulatory without problems.   Feet:      Right foot:      Protective Sensation: 5 sites tested.  5 sites sensed.      Skin integrity: No ulcer or skin breakdown.      Left foot:       Protective Sensation: 5 sites tested.  5 sites sensed.      Skin integrity: No ulcer or skin breakdown.   Lymphadenopathy:      Cervical: No cervical adenopathy.   Skin:     General: Skin is warm.   Neurological:      General: No focal deficit present.      Mental Status: He is alert and oriented to person, place, and time.   Psychiatric:         Mood and Affect: Mood normal.         Behavior: Behavior normal.         Thought Content: Thought content normal.         Judgment: Judgment normal.         ASSESSMENT:  1. Essential hypertension    2. Type 2 diabetes mellitus without complication, without long-term current use of insulin    3. CAD in native artery    4. Severe obesity (BMI 35.0-39.9) with comorbidity    5. Colon cancer screening    6. Need for vaccination    7. Need for prophylactic vaccination against Streptococcus pneumoniae (pneumococcus)        PLAN:  Rob was seen today for hypertension, diabetes and follow-up.    Diagnoses and all orders for this visit:    Essential hypertension    Type 2 diabetes mellitus without complication, without long-term current use of insulin  -     Hemoglobin A1C; Future    CAD in native artery    Severe obesity (BMI 35.0-39.9) with comorbidity    Colon cancer screening  -     Fecal Immunochemical Test (iFOBT); Future    Need for vaccination  -     Influenza - Trivalent - PF (ADULT)    Need for prophylactic vaccination against Streptococcus pneumoniae (pneumococcus)  -     pneumoc 20-alan conj-dip cr(PF) (PREVNAR-20 (PF)) injection Syrg 0.5 mL    Patient advised to call for results.  Continue current medications, follow low sodium, low cholesterol, low carb diet, daily walks.  Keep follow up with specialists.  Follow up in about 6 months (around 8/5/2025) for physical.

## 2025-03-11 ENCOUNTER — LAB VISIT (OUTPATIENT)
Dept: LAB | Facility: HOSPITAL | Age: 65
End: 2025-03-11
Attending: FAMILY MEDICINE
Payer: COMMERCIAL

## 2025-03-11 DIAGNOSIS — Z12.11 COLON CANCER SCREENING: ICD-10-CM

## 2025-03-11 LAB — HEMOCCULT STL QL IA: NEGATIVE

## 2025-03-11 PROCEDURE — 82274 ASSAY TEST FOR BLOOD FECAL: CPT | Performed by: FAMILY MEDICINE

## 2025-03-12 ENCOUNTER — RESULTS FOLLOW-UP (OUTPATIENT)
Dept: FAMILY MEDICINE | Facility: CLINIC | Age: 65
End: 2025-03-12

## 2025-06-15 DIAGNOSIS — E78.5 HYPERLIPIDEMIA LDL GOAL <70: ICD-10-CM

## 2025-06-15 DIAGNOSIS — J30.2 SEASONAL ALLERGIC RHINITIS, UNSPECIFIED TRIGGER: ICD-10-CM

## 2025-06-15 DIAGNOSIS — I25.10 CAD IN NATIVE ARTERY: ICD-10-CM

## 2025-06-16 RX ORDER — ROSUVASTATIN CALCIUM 10 MG/1
10 TABLET, COATED ORAL NIGHTLY
Qty: 90 TABLET | Refills: 0 | Status: SHIPPED | OUTPATIENT
Start: 2025-06-16

## 2025-06-16 NOTE — TELEPHONE ENCOUNTER
Care Due:                  Date            Visit Type   Department     Provider  --------------------------------------------------------------------------------                                EP -                              PRIMARY      [facility]  Last Visit: 02-      CARE ([facility])   LUIS Bruner                              EP -                              PRIMARY      [facility]  Next Visit: 08-      CARE ([facility])   MEDICINE       Mary Bruner                                                            Last  Test          Frequency    Reason                     Performed    Due Date  --------------------------------------------------------------------------------    CMP.........  12 months..  rosuvastatin.............  08- 08-    Lipid Panel.  12 months..  rosuvastatin.............  08- 08-    [employer] Embedded Care Due Messages. Reference number: [id number].   6/15/2025 8:51:54 PM CDT

## 2025-06-16 NOTE — TELEPHONE ENCOUNTER
Provider Staff:  Action required for this patient    Requires labs      Please see care gap opportunities below in Care Due Message.    Thanks!  [facility]     Appointments      Date Provider   Last Visit   2/7/2025 Mary Bruner MD   Next Visit   6/15/2025 Mary Bruner MD     Refill Decision Note   Rob Hickey  is requesting a refill authorization.  Brief Assessment and Rationale for Refill:  Approve     Medication Therapy Plan:         Comments:     Note composed:5:47 AM 06/16/2025

## 2025-06-16 NOTE — TELEPHONE ENCOUNTER
No care due was identified.  [facility] Embedded Care Due Messages. Reference number: [id number].   6/15/2025 9:08:02 PM CDT

## 2025-06-18 RX ORDER — FLUTICASONE PROPIONATE 50 MCG
2 SPRAY, SUSPENSION (ML) NASAL DAILY
Qty: 48 ML | Refills: 0 | Status: SHIPPED | OUTPATIENT
Start: 2025-06-18

## 2025-07-14 ENCOUNTER — LAB VISIT (OUTPATIENT)
Dept: LAB | Facility: HOSPITAL | Age: 65
End: 2025-07-14
Attending: UROLOGY
Payer: COMMERCIAL

## 2025-07-14 ENCOUNTER — OFFICE VISIT (OUTPATIENT)
Dept: UROLOGY | Facility: CLINIC | Age: 65
End: 2025-07-14
Payer: COMMERCIAL

## 2025-07-14 VITALS
DIASTOLIC BLOOD PRESSURE: 97 MMHG | HEIGHT: 70 IN | BODY MASS INDEX: 36.89 KG/M2 | HEART RATE: 67 BPM | SYSTOLIC BLOOD PRESSURE: 155 MMHG | WEIGHT: 257.69 LBS

## 2025-07-14 DIAGNOSIS — N42.32 ATYPICAL SMALL ACINAR PROLIFERATION OF PROSTATE: ICD-10-CM

## 2025-07-14 DIAGNOSIS — N40.0 BPH WITHOUT OBSTRUCTION/LOWER URINARY TRACT SYMPTOMS: ICD-10-CM

## 2025-07-14 DIAGNOSIS — R31.29 MICROHEMATURIA: ICD-10-CM

## 2025-07-14 DIAGNOSIS — N42.32 ATYPICAL SMALL ACINAR PROLIFERATION OF PROSTATE: Primary | ICD-10-CM

## 2025-07-14 LAB
BILIRUBIN, UA POC OHS: NEGATIVE
BLOOD, UA POC OHS: ABNORMAL
CLARITY, UA POC OHS: CLEAR
COLOR, UA POC OHS: YELLOW
GLUCOSE, UA POC OHS: NEGATIVE
KETONES, UA POC OHS: NEGATIVE
LEUKOCYTES, UA POC OHS: NEGATIVE
NITRITE, UA POC OHS: NEGATIVE
PH, UA POC OHS: 5
PROTEIN, UA POC OHS: 30
PSA SERPL-MCNC: 1.41 NG/ML
SPECIFIC GRAVITY, UA POC OHS: 1.02
UROBILINOGEN, UA POC OHS: 0.2

## 2025-07-14 PROCEDURE — 3077F SYST BP >= 140 MM HG: CPT | Mod: CPTII,S$GLB,, | Performed by: UROLOGY

## 2025-07-14 PROCEDURE — 3080F DIAST BP >= 90 MM HG: CPT | Mod: CPTII,S$GLB,, | Performed by: UROLOGY

## 2025-07-14 PROCEDURE — 99999 PR PBB SHADOW E&M-EST. PATIENT-LVL III: CPT | Mod: PBBFAC,,, | Performed by: UROLOGY

## 2025-07-14 PROCEDURE — 84153 ASSAY OF PSA TOTAL: CPT

## 2025-07-14 PROCEDURE — 3008F BODY MASS INDEX DOCD: CPT | Mod: CPTII,S$GLB,, | Performed by: UROLOGY

## 2025-07-14 PROCEDURE — 99214 OFFICE O/P EST MOD 30 MIN: CPT | Mod: S$GLB,,, | Performed by: UROLOGY

## 2025-07-14 PROCEDURE — 81003 URINALYSIS AUTO W/O SCOPE: CPT | Mod: QW,S$GLB,, | Performed by: UROLOGY

## 2025-07-14 PROCEDURE — 3051F HG A1C>EQUAL 7.0%<8.0%: CPT | Mod: CPTII,S$GLB,, | Performed by: UROLOGY

## 2025-07-14 PROCEDURE — 36415 COLL VENOUS BLD VENIPUNCTURE: CPT

## 2025-07-14 NOTE — PROGRESS NOTES
"Chief Complaint:   Encounter Diagnoses   Name Primary?    Atypical small acinar proliferation of prostate Yes    BPH without obstruction/lower urinary tract symptoms        HPI:  HPI Rob Hickey Jr. arvin 64 y.o. male who presents with annual follow up for his prostate.  He had an incidental discovery of a PI-RADS 5 lesion on MRI when undergoing evaluation of a cyst in the prostate.  He underwent a biopsy which showed atypical small acinar proliferation.  He has been on observation since.  His PSA has been stable.  He does have BPH but minimal symptoms.  He has recently retired.  He has a known history of microhematuria and underwent full evaluation.  History:  Social History[1]  Past Medical History:   Diagnosis Date    Anxiety disorder, unspecified     CAD in native artery 10/05/2020    Hypercholesterolemia 10/05/2020    Hypertension     Obesity (BMI 35.0-39.9 without comorbidity) 08/10/2020    SVT (supraventricular tachycardia) 05/04/2021    Type 2 diabetes mellitus without complication, without long-term current use of insulin 8/29/2024     Past Surgical History:   Procedure Laterality Date    None       Family History   Problem Relation Name Age of Onset    No Known Problems Sister      Heart disease Brother  45        MI/CAD    Heart disease Sister  23        MI/CAD    Cancer Neg Hx      Diabetes Neg Hx      Stroke Neg Hx      Hypertension Neg Hx         Medications Ordered Prior to Encounter[2]     Objective:     Vitals:    07/14/25 0906 07/14/25 0908   BP: (!) 159/93  Comment: per patient he isnt on any BP medications (!) 155/97   BP Location: Right arm Left arm   Patient Position: Sitting Sitting   Pulse: 66 67   Weight: 116.9 kg (257 lb 11.5 oz)    Height: 5' 10" (1.778 m)       BMI Readings from Last 1 Encounters:   07/14/25 36.98 kg/m²          Physical Exam  No acute distress alert and oriented   Respirations even unlabored   Abdomen is obese   Digital rectal exam reveals a 40 g smooth prostate  Lab " Results   Component Value Date    PSADIAG 1.3 07/05/2024    PSADIAG 1.2 01/03/2024    PSA 2.2 08/03/2023    PSA 1.5 08/04/2022    PSA 0.93 09/08/2020        Lab Results   Component Value Date    CREATININE 1.0 08/05/2024      Assessment:       1. Atypical small acinar proliferation of prostate    2. BPH without obstruction/lower urinary tract symptoms        Plan:     1. Atypical small acinar proliferation of prostate    2. BPH without obstruction/lower urinary tract symptoms       Orders Placed This Encounter    Prostate Specific Antigen, Diagnostic      BPH with minimal symptoms.  History of prostate biopsy with incidental discovery of atypical cells.  Recommend continued follow up with the annual PSA and CHRIS.           [1]   Social History  Tobacco Use    Smoking status: Never    Smokeless tobacco: Never   Substance Use Topics    Alcohol use: No     Alcohol/week: 0.0 standard drinks of alcohol    Drug use: No   [2]   Current Outpatient Medications on File Prior to Visit   Medication Sig Dispense Refill    blood sugar diagnostic Strp To check BG 1 times daily, to use with insurance preferred meter 50 strip 11    blood-glucose meter kit To check BG 1 times daily, to use with insurance preferred meter 1 each 0    fluticasone propionate (FLONASE) 50 mcg/actuation nasal spray 2 sprays (100 mcg total) by Each Nostril route once daily. 48 mL 0    lancets Misc To check BG 1 times daily, to use with insurance preferred meter 50 each 11    montelukast (SINGULAIR) 10 mg tablet TAKE 1 TABLET BY MOUTH EVERY DAY IN THE EVENING 90 tablet 1    rosuvastatin (CRESTOR) 10 MG tablet TAKE 1 TABLET BY MOUTH EVERY DAY IN THE EVENING 90 tablet 0     No current facility-administered medications on file prior to visit.

## 2025-08-06 DIAGNOSIS — E11.9 TYPE 2 DIABETES MELLITUS WITHOUT COMPLICATION, WITHOUT LONG-TERM CURRENT USE OF INSULIN: ICD-10-CM

## 2025-08-13 DIAGNOSIS — E11.9 TYPE 2 DIABETES MELLITUS WITHOUT COMPLICATION: ICD-10-CM

## 2025-08-18 ENCOUNTER — OFFICE VISIT (OUTPATIENT)
Dept: FAMILY MEDICINE | Facility: CLINIC | Age: 65
End: 2025-08-18
Attending: FAMILY MEDICINE
Payer: COMMERCIAL

## 2025-08-18 ENCOUNTER — LAB VISIT (OUTPATIENT)
Dept: LAB | Facility: HOSPITAL | Age: 65
End: 2025-08-18
Attending: FAMILY MEDICINE
Payer: COMMERCIAL

## 2025-08-18 VITALS
HEART RATE: 71 BPM | HEIGHT: 70 IN | SYSTOLIC BLOOD PRESSURE: 126 MMHG | DIASTOLIC BLOOD PRESSURE: 78 MMHG | TEMPERATURE: 97 F | BODY MASS INDEX: 37.43 KG/M2 | RESPIRATION RATE: 18 BRPM | OXYGEN SATURATION: 96 % | WEIGHT: 261.44 LBS

## 2025-08-18 DIAGNOSIS — M17.0 PRIMARY OSTEOARTHRITIS OF BOTH KNEES: ICD-10-CM

## 2025-08-18 DIAGNOSIS — E66.01 SEVERE OBESITY (BMI 35.0-39.9) WITH COMORBIDITY: ICD-10-CM

## 2025-08-18 DIAGNOSIS — E78.5 HYPERLIPIDEMIA LDL GOAL <70: ICD-10-CM

## 2025-08-18 DIAGNOSIS — E11.9 TYPE 2 DIABETES MELLITUS WITHOUT COMPLICATION, WITHOUT LONG-TERM CURRENT USE OF INSULIN: ICD-10-CM

## 2025-08-18 DIAGNOSIS — N42.32 ATYPICAL SMALL ACINAR PROLIFERATION OF PROSTATE: ICD-10-CM

## 2025-08-18 DIAGNOSIS — Z00.00 ANNUAL PHYSICAL EXAM: Primary | ICD-10-CM

## 2025-08-18 DIAGNOSIS — Z00.00 ANNUAL PHYSICAL EXAM: ICD-10-CM

## 2025-08-18 DIAGNOSIS — I10 ESSENTIAL HYPERTENSION: ICD-10-CM

## 2025-08-18 DIAGNOSIS — I25.10 CAD IN NATIVE ARTERY: ICD-10-CM

## 2025-08-18 LAB
ABSOLUTE EOSINOPHIL (OHS): 0.18 K/UL
ABSOLUTE MONOCYTE (OHS): 0.32 K/UL (ref 0.3–1)
ABSOLUTE NEUTROPHIL COUNT (OHS): 3.93 K/UL (ref 1.8–7.7)
ALBUMIN SERPL BCP-MCNC: 4.2 G/DL (ref 3.5–5.2)
ALBUMIN/CREAT UR: 12.3 UG/MG
ALP SERPL-CCNC: 51 UNIT/L (ref 40–150)
ALT SERPL W/O P-5'-P-CCNC: 25 UNIT/L (ref 0–55)
ANION GAP (OHS): 10 MMOL/L (ref 8–16)
AST SERPL-CCNC: 25 UNIT/L (ref 0–50)
BASOPHILS # BLD AUTO: 0.02 K/UL
BASOPHILS NFR BLD AUTO: 0.4 %
BILIRUB SERPL-MCNC: 0.5 MG/DL (ref 0.1–1)
BUN SERPL-MCNC: 12 MG/DL (ref 8–23)
CALCIUM SERPL-MCNC: 9.1 MG/DL (ref 8.7–10.5)
CHLORIDE SERPL-SCNC: 105 MMOL/L (ref 95–110)
CHOLEST SERPL-MCNC: 104 MG/DL (ref 120–199)
CHOLEST/HDLC SERPL: 2.7 {RATIO} (ref 2–5)
CO2 SERPL-SCNC: 24 MMOL/L (ref 23–29)
CREAT SERPL-MCNC: 1 MG/DL (ref 0.5–1.4)
CREAT UR-MCNC: 277 MG/DL (ref 23–375)
EAG (OHS): 166 MG/DL (ref 68–131)
ERYTHROCYTE [DISTWIDTH] IN BLOOD BY AUTOMATED COUNT: 13.6 % (ref 11.5–14.5)
GFR SERPLBLD CREATININE-BSD FMLA CKD-EPI: >60 ML/MIN/1.73/M2
GLUCOSE SERPL-MCNC: 123 MG/DL (ref 70–110)
HBA1C MFR BLD: 7.4 % (ref 4–5.6)
HCT VFR BLD AUTO: 56.7 % (ref 40–54)
HDLC SERPL-MCNC: 39 MG/DL (ref 40–75)
HDLC SERPL: 37.5 % (ref 20–50)
HGB BLD-MCNC: 18.1 GM/DL (ref 14–18)
IMM GRANULOCYTES # BLD AUTO: 0.03 K/UL (ref 0–0.04)
IMM GRANULOCYTES NFR BLD AUTO: 0.5 % (ref 0–0.5)
LDLC SERPL CALC-MCNC: 48.4 MG/DL (ref 63–159)
LYMPHOCYTES # BLD AUTO: 1.23 K/UL (ref 1–4.8)
MCH RBC QN AUTO: 29 PG (ref 27–31)
MCHC RBC AUTO-ENTMCNC: 31.9 G/DL (ref 32–36)
MCV RBC AUTO: 91 FL (ref 82–98)
MICROALBUMIN UR-MCNC: 34 UG/ML
NONHDLC SERPL-MCNC: 65 MG/DL
NUCLEATED RBC (/100WBC) (OHS): 0 /100 WBC
PLATELET # BLD AUTO: 159 K/UL (ref 150–450)
PMV BLD AUTO: 10.4 FL (ref 9.2–12.9)
POTASSIUM SERPL-SCNC: 4.2 MMOL/L (ref 3.5–5.1)
PROT SERPL-MCNC: 7.3 GM/DL (ref 6–8.4)
RBC # BLD AUTO: 6.25 M/UL (ref 4.6–6.2)
RELATIVE EOSINOPHIL (OHS): 3.2 %
RELATIVE LYMPHOCYTE (OHS): 21.5 % (ref 18–48)
RELATIVE MONOCYTE (OHS): 5.6 % (ref 4–15)
RELATIVE NEUTROPHIL (OHS): 68.8 % (ref 38–73)
SODIUM SERPL-SCNC: 139 MMOL/L (ref 136–145)
TRIGL SERPL-MCNC: 83 MG/DL (ref 30–150)
TSH SERPL-ACNC: 1.61 UIU/ML (ref 0.4–4)
WBC # BLD AUTO: 5.71 K/UL (ref 3.9–12.7)

## 2025-08-18 PROCEDURE — 1160F RVW MEDS BY RX/DR IN RCRD: CPT | Mod: CPTII,S$GLB,, | Performed by: FAMILY MEDICINE

## 2025-08-18 PROCEDURE — 3008F BODY MASS INDEX DOCD: CPT | Mod: CPTII,S$GLB,, | Performed by: FAMILY MEDICINE

## 2025-08-18 PROCEDURE — 83036 HEMOGLOBIN GLYCOSYLATED A1C: CPT

## 2025-08-18 PROCEDURE — 3078F DIAST BP <80 MM HG: CPT | Mod: CPTII,S$GLB,, | Performed by: FAMILY MEDICINE

## 2025-08-18 PROCEDURE — 85025 COMPLETE CBC W/AUTO DIFF WBC: CPT

## 2025-08-18 PROCEDURE — 3074F SYST BP LT 130 MM HG: CPT | Mod: CPTII,S$GLB,, | Performed by: FAMILY MEDICINE

## 2025-08-18 PROCEDURE — 80061 LIPID PANEL: CPT

## 2025-08-18 PROCEDURE — 99999 PR PBB SHADOW E&M-EST. PATIENT-LVL V: CPT | Mod: PBBFAC,,, | Performed by: FAMILY MEDICINE

## 2025-08-18 PROCEDURE — 36415 COLL VENOUS BLD VENIPUNCTURE: CPT | Mod: PO

## 2025-08-18 PROCEDURE — 84443 ASSAY THYROID STIM HORMONE: CPT

## 2025-08-18 PROCEDURE — 3051F HG A1C>EQUAL 7.0%<8.0%: CPT | Mod: CPTII,S$GLB,, | Performed by: FAMILY MEDICINE

## 2025-08-18 PROCEDURE — 1159F MED LIST DOCD IN RCRD: CPT | Mod: CPTII,S$GLB,, | Performed by: FAMILY MEDICINE

## 2025-08-18 PROCEDURE — 82043 UR ALBUMIN QUANTITATIVE: CPT | Performed by: FAMILY MEDICINE

## 2025-08-18 PROCEDURE — 82040 ASSAY OF SERUM ALBUMIN: CPT

## 2025-08-18 PROCEDURE — 99396 PREV VISIT EST AGE 40-64: CPT | Mod: S$GLB,,, | Performed by: FAMILY MEDICINE

## 2025-08-18 RX ORDER — ROSUVASTATIN CALCIUM 10 MG/1
10 TABLET, COATED ORAL NIGHTLY
Qty: 90 TABLET | Refills: 3 | Status: SHIPPED | OUTPATIENT
Start: 2025-08-18

## 2025-08-18 RX ORDER — LOSARTAN POTASSIUM AND HYDROCHLOROTHIAZIDE 12.5; 5 MG/1; MG/1
1 TABLET ORAL DAILY
COMMUNITY
Start: 2025-07-30

## 2025-08-18 RX ORDER — METFORMIN HYDROCHLORIDE 500 MG/1
500 TABLET, EXTENDED RELEASE ORAL 2 TIMES DAILY WITH MEALS
COMMUNITY
Start: 2025-07-30

## 2025-08-19 ENCOUNTER — PATIENT MESSAGE (OUTPATIENT)
Dept: ADMINISTRATIVE | Facility: HOSPITAL | Age: 65
End: 2025-08-19
Payer: COMMERCIAL

## (undated) DEVICE — PAD DEFIB CADENCE ADULT R2

## (undated) DEVICE — CATH RESPONSE QPLR JSN 6F 120

## (undated) DEVICE — CATH SAFIRE 7FR 4CC LG SWEEP

## (undated) DEVICE — KIT PROBE COVER WITH GEL

## (undated) DEVICE — KIT ENSITE ELECTRODE SURFACE

## (undated) DEVICE — CATH BIDIRECTIONAL DF CRV 7FR

## (undated) DEVICE — PACK EP DRAPE

## (undated) DEVICE — CATH SUPREME QPLR CRD-2 6F 120

## (undated) DEVICE — INTRO 8.5FR 63CM SRO

## (undated) DEVICE — PAD GROUND UNIV STYLE CORD 9IN

## (undated) DEVICE — INTRODUCER HEMOSTASIS 7.5F